# Patient Record
Sex: FEMALE | Race: WHITE | Employment: FULL TIME | URBAN - METROPOLITAN AREA
[De-identification: names, ages, dates, MRNs, and addresses within clinical notes are randomized per-mention and may not be internally consistent; named-entity substitution may affect disease eponyms.]

---

## 2021-12-13 ENCOUNTER — OFFICE VISIT (OUTPATIENT)
Dept: ORTHOPEDIC SURGERY | Age: 41
End: 2021-12-13
Payer: OTHER GOVERNMENT

## 2021-12-13 VITALS
BODY MASS INDEX: 32.14 KG/M2 | HEART RATE: 74 BPM | HEIGHT: 66 IN | OXYGEN SATURATION: 96 % | WEIGHT: 200 LBS | TEMPERATURE: 97.2 F

## 2021-12-13 DIAGNOSIS — M51.36 DDD (DEGENERATIVE DISC DISEASE), LUMBAR: Primary | ICD-10-CM

## 2021-12-13 DIAGNOSIS — M54.16 LUMBAR RADICULOPATHY, CHRONIC: ICD-10-CM

## 2021-12-13 PROCEDURE — 99204 OFFICE O/P NEW MOD 45 MIN: CPT | Performed by: PHYSICAL MEDICINE & REHABILITATION

## 2021-12-13 RX ORDER — ATORVASTATIN CALCIUM 80 MG/1
80 TABLET, FILM COATED ORAL DAILY
COMMUNITY

## 2021-12-13 RX ORDER — MULTIVITAMIN
1 CAPSULE ORAL DAILY
COMMUNITY

## 2021-12-13 RX ORDER — FLUTICASONE PROPIONATE 50 MCG
2 SPRAY, SUSPENSION (ML) NASAL DAILY
COMMUNITY
End: 2022-08-25

## 2021-12-13 RX ORDER — CETIRIZINE HCL 10 MG
10 TABLET ORAL DAILY
COMMUNITY
End: 2022-08-25

## 2021-12-13 RX ORDER — EVOLOCUMAB 140 MG/ML
INJECTION, SOLUTION SUBCUTANEOUS
COMMUNITY
Start: 2021-11-18

## 2021-12-13 NOTE — PROGRESS NOTES
[address], [address]  [place], [address] [place] [address] Street  Phone: [phone]  Fax: [phone]      Patient: Bhavana Kilgore                                                                              MRN: [id number]        YOB: 1980          AGE: 35 y.o. PCP: Leyda, MD [place]  Date:  12/13/21    Reason for Consultation: Back Pain      HPI:  Bhavana Kilgore is a 35 y.o. female with relevant PMH of HLD who presents with low back pain which began over 10 years ago. In 2016 she was diagnosed with spinal stenosis and tried a course of PT. Over the years she has remained active exercising regularly. Over the past 6 months to 1 year her pain has worsened. She now has sharp low back pain with intermittent radiation down either the left or the right leg. Pain is worse with sneezing. She saw her PCP who got an MRI which demonstrated L5/S1 severe DDD with b/l foraminal narrowing L>R. Denies any precipitating incident or trauma. Neurologic symptoms: No numbness, tingling, weakness, bowel or bladder changes. No recent falls      Location: The pain is located in the low back  Radiation: The pain does radiate bilateral L>R    Pain Score: Currently: 9/10    Quality: Pain is of a Stabbing, Stiff, Tight and Pulling quality. Aggravating: Pain is exacerbated by walking, standing, exercise and sneezing  Alleviating: The pain is alleviated by lying down with knees elevated    Prior Treatments:   TENs  Physical therapy- 2018  Stretching  Massage  Previous Medications:   Current Medications: ibuprofen   Previous work-up has included:   MRI lumbar spine 2021  T12-L1: Stable level. Minimal left eccentric disc bulge. Mild right facet arthropathy. No significant central or foraminal stenosis. L1-L2: Stable level. No significant central, foraminal or subarticular recess stenosis. L2-L3: Slight progression of minimal right eccentric disc bulge. No significant central, foraminal or subarticular recess stenosis. L3-L4: Slight progression of mild, broad-based, left eccentric disc bulge. Mild facet arthropathy. No significant central or subarticular recess stenosis. Mild bilateral foraminal stenosis. L4-L5: Slight progression of minimal diffuse disc bulge. Mild facet arthropathy. No significant central or subarticular recess stenosis. Mild bilateral foraminal stenosis. L5-S1: Stable level. Small/moderate, broad-based, left eccentric disc extrusion with T2 high intensity zone dissects caudally in the left paracentral station and extends into the left greater than right foramen. Mild endplate ridging. Mild facet arthropathy. Mild left subarticular recess stenosis. Otherwise patent canal. Mild/moderate left and mild right foraminal stenosis. Mass effect on the exiting left greater than right L5 nerve roots. Past Medical History:   Past Medical History:   Diagnosis Date    Anxiety     High cholesterol     Spinal stenosis of lumbar region       Past Surgical History: History reviewed. No pertinent surgical history. SocHx:   Social History     Tobacco Use    Smoking status: Never Smoker    Smokeless tobacco: Never Used   Substance Use Topics    Alcohol use: Not on file      FamHx:? No family history on file. Current Medications:    Current Outpatient Medications   Medication Sig Dispense Refill    atorvastatin (LIPITOR) 80 mg tablet Take 80 mg by mouth daily.  Repatha Syringe syringe by SubCUTAneous route every fourteen (14) days.  multivitamin capsule Take 1 Capsule by mouth daily.  cetirizine (ZyrTEC) 10 mg tablet Take 10 mg by mouth daily.  fluticasone propionate (Flonase Allergy Relief) 50 mcg/actuation nasal spray 2 Sprays by Both Nostrils route daily.         Allergies:  No Known Allergies     Review of Systems:   Gen:    Denied fevers, chills, malaise, fatigue, weight changes   Resp: Denied shortness of breath, cough, wheezing   CVS: Denied chest pain, palpitations   : Denied urinary urgency, frequency, incontinence   GI: Denied nausea, vomiting, constipation, diarrhea   Skin: Denied rashes, wounds   Psych: Denied anxiety, depression   Vasc: Denied claudication, ulcers   Hem: Denied easy bruising/bleeding   MSK: See HPI   Neuro: See HPI         Physical Exam     Vital Signs:   Visit Vitals  Pulse 74   Temp 97.2 °F (36.2 °C) (Skin)   Ht 5' 6\" (1.676 m)   Wt 200 lb (90.7 kg)   LMP 12/03/2021 (LMP Unknown)   SpO2 96% Comment: RA   BMI 32.28 kg/m²      General: ??????? Well nourished and well developed female without any acute distress   Psychiatric: ?  Alert and oriented x 3 with normal mood    HEENT: ???????? Atraumatic   Respiratory:   Breathing non-labored and non dyspneic   CV: ???????????????? Peripheral pulses intact, no peripheral edema   Skin: ????????????? No rashes       Neurologic: ?? Sensation: normal and grossly intact thebilateral, lower extremity(s)   Strength: 5/5 in the bilateral, lower extremity(s)  Reflexes: reveals 2+ symmetric DTRs throughout LE  Gait: normal     Musculoskeletal: Lumbar Exam     Inspection:   Alignment: Normal  Atrophy: None     Tenderness to Palpation:   Lumbar paraspinals Positive  Lumbar spinous processes Negative  SI Joint:  Negative  Gluteal:Negative   Greater trochanter: Negative      ROM:   Lumbar ROM: Abnormal pain with flexion and extension worse with flexion  Lumbar facet loading: Negative  Hip ROM: No reproduction of pain with movement     Special Tests      Slump test: Negative  SLR: Negative  Tight hamstrings  Tight hip flexors  JED: Positive low back pain  FADIR: Negative  Stinchfield: Negative    Medical Decision Making:    Images: The imaging results as well as the actual images of the studies below were reviewed and visualized. Labs: The results below were reviewed.    MRI lumbar spine images reviewed from [facility]- with L5/S1 DDD, left disc bulge with L>R foraminal narrowing, facet arthritis      Assessment:   - lumbar spondylosis l5-S1 with b/l foraminal narrowing    Plan:      -Physical therapy - referral to PT LE flexibility, core strengthening    -Medications - ibuprofen . Counseled regarding side effects and appropriate administration of medications.    -Diagnostics/Imaging - Reviewed MRI lumbar spine  -Injections -Referral to try L5/S IL SHANNON for b/l lumbar radiculopathy   -Lifestyle - Discussed exercises to avoid and those to continue   -Education - The patient's diagnosis, prognosis and treatment options were discussed today. All questions were answered.    F/U - after [address] [facility]

## 2021-12-13 NOTE — H&P (VIEW-ONLY)
[address]  [address], [address]  [place], [address] [place] [address] Street  Phone: [phone]  Fax: [phone]      Patient: Satnam Chavez                                                                              MRN: [id number]        YOB: 1980          AGE: 35 y.o. PCP: [place] Crabtree MD  Date:  12/13/21    Reason for Consultation: Back Pain      HPI:  Satnam Chavez is a 35 y.o. female with relevant PMH of HLD who presents with low back pain which began over 10 years ago. In 2016 she was diagnosed with spinal stenosis and tried a course of PT. Over the years she has remained active exercising regularly. Over the past 6 months to 1 year her pain has worsened. She now has sharp low back pain with intermittent radiation down either the left or the right leg. Pain is worse with sneezing. She saw her PCP who got an MRI which demonstrated L5/S1 severe DDD with b/l foraminal narrowing L>R. Denies any precipitating incident or trauma. Neurologic symptoms: No numbness, tingling, weakness, bowel or bladder changes. No recent falls      Location: The pain is located in the low back  Radiation: The pain does radiate bilateral L>R    Pain Score: Currently: 9/10    Quality: Pain is of a Stabbing, Stiff, Tight and Pulling quality. Aggravating: Pain is exacerbated by walking, standing, exercise and sneezing  Alleviating: The pain is alleviated by lying down with knees elevated    Prior Treatments:   TENs  Physical therapy- 2018  Stretching  Massage  Previous Medications:   Current Medications: ibuprofen   Previous work-up has included:   MRI lumbar spine 2021  T12-L1: Stable level. Minimal left eccentric disc bulge. Mild right facet arthropathy. No significant central or foraminal stenosis. L1-L2: Stable level. No significant central, foraminal or subarticular recess stenosis. L2-L3: Slight progression of minimal right eccentric disc bulge. No significant central, foraminal or subarticular recess stenosis. L3-L4: Slight progression of mild, broad-based, left eccentric disc bulge. Mild facet arthropathy. No significant central or subarticular recess stenosis. Mild bilateral foraminal stenosis. L4-L5: Slight progression of minimal diffuse disc bulge. Mild facet arthropathy. No significant central or subarticular recess stenosis. Mild bilateral foraminal stenosis. L5-S1: Stable level. Small/moderate, broad-based, left eccentric disc extrusion with T2 high intensity zone dissects caudally in the left paracentral station and extends into the left greater than right foramen. Mild endplate ridging. Mild facet arthropathy. Mild left subarticular recess stenosis. Otherwise patent canal. Mild/moderate left and mild right foraminal stenosis. Mass effect on the exiting left greater than right L5 nerve roots. Past Medical History:   Past Medical History:   Diagnosis Date    Anxiety     High cholesterol     Spinal stenosis of lumbar region       Past Surgical History: History reviewed. No pertinent surgical history. SocHx:   Social History     Tobacco Use    Smoking status: Never Smoker    Smokeless tobacco: Never Used   Substance Use Topics    Alcohol use: Not on file      FamHx:? No family history on file. Current Medications:    Current Outpatient Medications   Medication Sig Dispense Refill    atorvastatin (LIPITOR) 80 mg tablet Take 80 mg by mouth daily.  Repatha Syringe syringe by SubCUTAneous route every fourteen (14) days.  multivitamin capsule Take 1 Capsule by mouth daily.  cetirizine (ZyrTEC) 10 mg tablet Take 10 mg by mouth daily.  fluticasone propionate (Flonase Allergy Relief) 50 mcg/actuation nasal spray 2 Sprays by Both Nostrils route daily.         Allergies:  No Known Allergies     Review of Systems:   Gen:    Denied fevers, chills, malaise, fatigue, weight changes   Resp: Denied shortness of breath, cough, wheezing   CVS: Denied chest pain, palpitations   : Denied urinary urgency, frequency, incontinence   GI: Denied nausea, vomiting, constipation, diarrhea   Skin: Denied rashes, wounds   Psych: Denied anxiety, depression   Vasc: Denied claudication, ulcers   Hem: Denied easy bruising/bleeding   MSK: See HPI   Neuro: See HPI         Physical Exam     Vital Signs:   Visit Vitals  Pulse 74   Temp 97.2 °F (36.2 °C) (Skin)   Ht 5' 6\" (1.676 m)   Wt 200 lb (90.7 kg)   LMP 12/03/2021 (LMP Unknown)   SpO2 96% Comment: RA   BMI 32.28 kg/m²      General: ??????? Well nourished and well developed female without any acute distress   Psychiatric: ?  Alert and oriented x 3 with normal mood    HEENT: ???????? Atraumatic   Respiratory:   Breathing non-labored and non dyspneic   CV: ???????????????? Peripheral pulses intact, no peripheral edema   Skin: ????????????? No rashes       Neurologic: ?? Sensation: normal and grossly intact thebilateral, lower extremity(s)   Strength: 5/5 in the bilateral, lower extremity(s)  Reflexes: reveals 2+ symmetric DTRs throughout LE  Gait: normal     Musculoskeletal: Lumbar Exam     Inspection:   Alignment: Normal  Atrophy: None     Tenderness to Palpation:   Lumbar paraspinals Positive  Lumbar spinous processes Negative  SI Joint:  Negative  Gluteal:Negative   Greater trochanter: Negative      ROM:   Lumbar ROM: Abnormal pain with flexion and extension worse with flexion  Lumbar facet loading: Negative  Hip ROM: No reproduction of pain with movement     Special Tests      Slump test: Negative  SLR: Negative  Tight hamstrings  Tight hip flexors  JED: Positive low back pain  FADIR: Negative  Stinchfield: Negative    Medical Decision Making:    Images: The imaging results as well as the actual images of the studies below were reviewed and visualized. Labs: The results below were reviewed.    MRI lumbar spine images reviewed from [facility]- with L5/S1 DDD, left disc bulge with L>R foraminal narrowing, facet arthritis      Assessment:   - lumbar spondylosis l5-S1 with b/l foraminal narrowing    Plan:      -Physical therapy - referral to PT LE flexibility, core strengthening    -Medications - ibuprofen . Counseled regarding side effects and appropriate administration of medications.    -Diagnostics/Imaging - Reviewed MRI lumbar spine  -Injections -Referral to try L5/S IL SHANNON for b/l lumbar radiculopathy   -Lifestyle - Discussed exercises to avoid and those to continue   -Education - The patient's diagnosis, prognosis and treatment options were discussed today. All questions were answered.    F/U - after [address] [facility]

## 2021-12-13 NOTE — PROGRESS NOTES
Fortunato Bach presents today for   Chief Complaint   Patient presents with    Back Pain       Is someone accompanying this pt? no    Is the patient using any DME equipment during OV? no      Coordination of Care:  1. Have you been to the ER, urgent care clinic since your last visit? no  Hospitalized since your last visit? no    2. Have you seen or consulted any other health care providers outside of the [address] [place] [address] since your last visit? no Include any pap smears or colon screening.  no

## 2022-01-04 ENCOUNTER — APPOINTMENT (OUTPATIENT)
Dept: GENERAL RADIOLOGY | Age: 42
End: 2022-01-04
Attending: PHYSICAL MEDICINE & REHABILITATION
Payer: OTHER GOVERNMENT

## 2022-01-04 ENCOUNTER — HOSPITAL ENCOUNTER (OUTPATIENT)
Age: 42
Setting detail: OUTPATIENT SURGERY
Discharge: HOME | End: 2022-01-04
Attending: PHYSICAL MEDICINE & REHABILITATION | Admitting: PHYSICAL MEDICINE & REHABILITATION
Payer: OTHER GOVERNMENT

## 2022-01-04 VITALS
DIASTOLIC BLOOD PRESSURE: 82 MMHG | OXYGEN SATURATION: 96 % | SYSTOLIC BLOOD PRESSURE: 113 MMHG | TEMPERATURE: 99 F | RESPIRATION RATE: 16 BRPM | HEART RATE: 93 BPM

## 2022-01-04 LAB — HCG UR QL: NEGATIVE

## 2022-01-04 PROCEDURE — 77030014124 HC TY EPDRL BBMI -A: Performed by: PHYSICAL MEDICINE & REHABILITATION

## 2022-01-04 PROCEDURE — 62323 NJX INTERLAMINAR LMBR/SAC: CPT | Performed by: PHYSICAL MEDICINE & REHABILITATION

## 2022-01-04 PROCEDURE — 74011000636 HC RX REV CODE- 636: Performed by: PHYSICAL MEDICINE & REHABILITATION

## 2022-01-04 PROCEDURE — 76010000009 HC PAIN MGT 0 TO 30 MIN PROC: Performed by: PHYSICAL MEDICINE & REHABILITATION

## 2022-01-04 PROCEDURE — 74011000250 HC RX REV CODE- 250: Performed by: PHYSICAL MEDICINE & REHABILITATION

## 2022-01-04 PROCEDURE — 74011250637 HC RX REV CODE- 250/637: Performed by: PHYSICAL MEDICINE & REHABILITATION

## 2022-01-04 PROCEDURE — 81025 URINE PREGNANCY TEST: CPT

## 2022-01-04 PROCEDURE — 2709999900 HC NON-CHARGEABLE SUPPLY: Performed by: PHYSICAL MEDICINE & REHABILITATION

## 2022-01-04 PROCEDURE — 74011250636 HC RX REV CODE- 250/636: Performed by: PHYSICAL MEDICINE & REHABILITATION

## 2022-01-04 RX ORDER — DEXAMETHASONE SODIUM PHOSPHATE 100 MG/10ML
INJECTION INTRAMUSCULAR; INTRAVENOUS AS NEEDED
Status: DISCONTINUED | OUTPATIENT
Start: 2022-01-04 | End: 2022-01-04 | Stop reason: HOSPADM

## 2022-01-04 RX ORDER — LIDOCAINE HYDROCHLORIDE 10 MG/ML
INJECTION, SOLUTION EPIDURAL; INFILTRATION; INTRACAUDAL; PERINEURAL AS NEEDED
Status: DISCONTINUED | OUTPATIENT
Start: 2022-01-04 | End: 2022-01-04 | Stop reason: HOSPADM

## 2022-01-04 RX ORDER — DIAZEPAM 5 MG/1
5-20 TABLET ORAL ONCE
Status: COMPLETED | OUTPATIENT
Start: 2022-01-04 | End: 2022-01-04

## 2022-01-04 RX ADMIN — DIAZEPAM 10 MG: 5 TABLET ORAL at 11:27

## 2022-01-04 NOTE — DISCHARGE INSTRUCTIONS
[facility]   ([place])  Dr. Marques Patel, Dr. Viky Proctor, Dr. Judi Tyson Spinal Procedure (Block) Instructions    * Do not drive a car, operate heavy machinery or dangerous equipment, or make important decisions for 12-24 hours. * Light activity as tolerated; may rest for the remainder of the day. * Resume pre-block medications including those from your other doctors. * Do not drink alcoholic beverages for 24 hours. Alcohol and the medications you have received may interact and cause an adverse reaction. * You may feel better this evening and worse tomorrow, as the numbing medications wears off and the steroid has yet to begin to work. After 48-72 hrs the steroid should begin to release bringing you relief. If you had a medial branch block, no steroids were used. The medial branch block is a test to see if you are a candidate for radiofrequency ablation (RFA). The anesthetic (numbing medicine)  will wear off by the next day. * You may shower this evening and remove any bandages. * Avoid hot tubs/pools/tub soaks and heating pads for 24 hours. You may use cold packs on the procedure site as tolerated for the first 24 hours. * If a headache develops, drink plenty of fluids and rest.  Take over the counter medications for headache if needed. If the headache continues longer than 24 hours, call MD at the [id number] [place] [address] Avenue. [phone]    * Continue taking pain medications as needed. * You may resume your regular diet if tolerated. Otherwise, start with sips of water and advance slowly. * If Diabetic: check your blood sugar three times a day for the next 3 days. If your sugar is greater than 300 call your family doctor. If your sugar is greater than 400, have someone transport you to the nearest Emergency Room. * If you experience any of the following problems, Please Call the [address] [place] [address] Avenue at [phone].         * Excessive pain, swelling, redness or odor at or around the surgical area    * Fever of 101 or higher    * Nausea / Vomiting lasting longer than 4 hours or if unable to take medications. * Severe Headache    * Weakness or numbness in arms or legs that is not      resolving   * Any NEW signs of decreased circulation or nerve impairment in leg: change in color, swelling, persistent numbness, tingling                    * Prolonged increase in pain greater than 4 days      PATIENT INSTRUCTIONS:    After oral sedation, for 12-24 hours or while taking prescription Narcotics:  · Limit your activities  · Do not drive and operate hazardous machinery  · Do not make important personal or business decisions  · Do  not drink alcoholic beverages  · If you have not urinated within 8 hours after discharge, please contact your surgeon on call. *  Please give a list of your current medications to your Primary Care Provider. *  Please update this list whenever your medications are discontinued, doses are      changed, or new medications (including over-the-counter products) are added. *  Please carry medication information at all times in case of emergency situations. These are general instructions for a healthy lifestyle:    No smoking/ No tobacco products/ Avoid exposure to second hand smoke    Surgeon General's Warning:  Quitting smoking now greatly reduces serious risk to your health. Obesity, smoking, and sedentary lifestyle greatly increases your risk for illness    A healthy diet, regular physical exercise & weight monitoring are important for maintaining a healthy lifestyle    You may be retaining fluid if you have a history of heart failure or if you experience any of the following symptoms:  Weight gain of 3 pounds or more overnight or 5 pounds in a week, increased swelling in our hands or feet or shortness of breath while lying flat in bed.   Please call your doctor as soon as you notice any of these symptoms; do not wait until your next office visit. Recognize signs and symptoms of STROKE:    F-face looks uneven    A-arms unable to move or move unevenly    S-speech slurred or non-existent    T-time-call 911 as soon as signs and symptoms begin-DO NOT go       Back to bed or wait to see if you get better-TIME IS BRAIN.

## 2022-01-04 NOTE — INTERVAL H&P NOTE
Update History & Physical    The Patient's History and Physical of December 13, 2021 was reviewed. There was no change. The surgical site was confirmed by the patient and me. Plan:  The risk, benefits, expected outcome, and alternative to the recommended procedure have been discussed with the patient. Patient understands and wants to proceed with the procedure.     Electronically signed by Sanam Kinsey MD on 1/4/2022 at 11:54 AM

## 2022-01-04 NOTE — PROCEDURES
Intralaminar Epidural Steroid Procedure Note        Patient Name   Bassem Sandoval  Date of Procedure: January 4, 2022  Preoperative Diagnosis: Lumbar spinal stenosis  Postoperative Diagnosis: Same  Location [facility], [address]      Procedure:  Epidural Steroid Injection    Consent:  Informed consent was obtained prior to the procedure. In addition to the potential risks associated with the procedure itself, the patient was informed both verbally and in writing of the potential side effects of the use of glucocorticoid. The patient appeared to comprehend the informed consent and desired to have the procedure performed. Procedure in Detail:  The patient was taken to the procedure suite and placed in the prone position on the operating table on appropriate padding. The posterior lumbar region was prepped and draped in the usual sterile fashion. Intraoperative fluoroscopy was used to localize the L5-S1 interspace. The skin was infiltrated with 1% lidocaine. An 18-gauge standard #6 spinal Tuohy needle was advanced into the epidural space at L5-S1 under fluoroscopic guidance using the loss of resistance technique. No cerebrospinal fluid was seen throughout the procedure. Yes  A small amount of Isovue was injected into the epidural space, confirming appropriated needle placement on fluoroscopy. No vascular uptake was identified. Next, 2ml of 1% Lidocaine and 10mg of preservative free Dexamethasone were injected via the Tuohy needle. The needle was removed from the patient. The patient tolerated the procedure well and was discharged home with designated  and care instructions. Patient reported clint-procedural pain on Visual Analog Scale:  pre-6; post-3.       Signed By: Reese Lu MD                      January 4, 2022

## 2022-02-01 ENCOUNTER — VIRTUAL VISIT (OUTPATIENT)
Dept: ORTHOPEDIC SURGERY | Age: 42
End: 2022-02-01
Payer: OTHER GOVERNMENT

## 2022-02-01 DIAGNOSIS — M54.16 LUMBAR RADICULOPATHY, CHRONIC: Primary | ICD-10-CM

## 2022-02-01 DIAGNOSIS — M51.36 DDD (DEGENERATIVE DISC DISEASE), LUMBAR: ICD-10-CM

## 2022-02-01 PROCEDURE — 99214 OFFICE O/P EST MOD 30 MIN: CPT | Performed by: PHYSICAL MEDICINE & REHABILITATION

## 2022-02-01 RX ORDER — CELECOXIB 100 MG/1
100 CAPSULE ORAL 2 TIMES DAILY
Qty: 60 CAPSULE | Refills: 0 | Status: SHIPPED | OUTPATIENT
Start: 2022-02-01 | End: 2022-03-03

## 2022-02-01 NOTE — PROGRESS NOTES
[address]  [address], [address]  [facility] [address]  Phone: [phone]  Fax: [phone]      Patient: Nina Beltre                                                                              MRN: [id number]        YOB: 1980          AGE: 35 y.o. PCP: Other, MD [place]  Date:  02/01/22    Reason for Consultation: No chief complaint on file. Video Visit    HPI:  Nina Beltre is a 35 y.o. female with relevant PMH of HLD who presents with low back pain which began over 10 years ago. In 2016 she was diagnosed with spinal stenosis and tried a course of PT. Over the years she has remained active exercising regularly. Over the past 6 months to 1 year her pain has worsened. She now has sharp low back pain with intermittent radiation down either the left or the right leg. Pain is worse with sneezing. She saw her PCP who got an MRI which demonstrated L5/S1 severe DDD with b/l foraminal narrowing L>R. She had an L5-S1 IL SHANNON 1/4/2022 which helped a little about 10%. Had PT assessment 2 weeks ago first appointment next week      Denies any precipitating incident or trauma. Neurologic symptoms: No numbness, tingling, weakness, bowel or bladder changes. No recent falls      Location: The pain is located in the low back  Radiation: The pain does radiate bilateral L>R    Pain Score: Currently: 2/10  Nut at times gets sharp 9/10 shooting pain into b/l hips   Quality: Pain is of a Stabbing, Stiff, Tight and Pulling quality. Aggravating: Pain is exacerbated by walking, standing, exercise and sneezing  Alleviating: The pain is alleviated by lying down with knees elevated    Prior Treatments:   TENs  Physical therapy- 2018  Stretching  Massage  Injections: L5-S1 IL SHANNON 10% relief  Previous Medications:   Current Medications: ibuprofen   Previous work-up has included:   MRI lumbar spine 2021  T12-L1: Stable level.  Minimal left eccentric disc bulge. Mild right facet arthropathy. No significant central or foraminal stenosis. L1-L2: Stable level. No significant central, foraminal or subarticular recess stenosis. L2-L3: Slight progression of minimal right eccentric disc bulge. No significant central, foraminal or subarticular recess stenosis. L3-L4: Slight progression of mild, broad-based, left eccentric disc bulge. Mild facet arthropathy. No significant central or subarticular recess stenosis. Mild bilateral foraminal stenosis. L4-L5: Slight progression of minimal diffuse disc bulge. Mild facet arthropathy. No significant central or subarticular recess stenosis. Mild bilateral foraminal stenosis. L5-S1: Stable level. Small/moderate, broad-based, left eccentric disc extrusion with T2 high intensity zone dissects caudally in the left paracentral station and extends into the left greater than right foramen. Mild endplate ridging. Mild facet arthropathy. Mild left subarticular recess stenosis. Otherwise patent canal. Mild/moderate left and mild right foraminal stenosis. Mass effect on the exiting left greater than right L5 nerve roots. Past Medical History:   Past Medical History:   Diagnosis Date    Anxiety     High cholesterol     Spinal stenosis of lumbar region       Past Surgical History: No past surgical history on file. SocHx:   Social History     Tobacco Use    Smoking status: Never Smoker    Smokeless tobacco: Never Used   Substance Use Topics    Alcohol use: Not on file      FamHx:? No family history on file. Current Medications:    Current Outpatient Medications   Medication Sig Dispense Refill    atorvastatin (LIPITOR) 80 mg tablet Take 80 mg by mouth daily.  Repatha Syringe syringe by SubCUTAneous route every fourteen (14) days.  multivitamin capsule Take 1 Capsule by mouth daily.  cetirizine (ZyrTEC) 10 mg tablet Take 10 mg by mouth daily.       fluticasone propionate (Flonase Allergy Relief) 50 mcg/actuation nasal spray 2 Sprays by Both Nostrils route daily. Allergies:  No Known Allergies       Medical Decision Making:    Images: The imaging results as well as the actual images of the studies below were reviewed and visualized. Assessment:   - lumbar spondylosis l5-S1 with b/l foraminal narrowing    Plan:      -Physical therapy - Continue PT LE flexibility, core strengthening    -Medications -will try celebrex 100mg bid prn in place of ibuprofen . Counseled regarding side effects and appropriate administration of medications.    -Diagnostics/Imaging - Reviewed MRI lumbar spine  -Injections -consider b/l L5/S1 TF SHANNON   -Lifestyle - Discussed exercises to avoid and those to continue   -Education - The patient's diagnosis, prognosis and treatment options were discussed today. All questions were answered. F/U - after completing PT        [address] [facility]      I was in the office while conducting this encounter. Patient not in the office    Consent:  She and/or her healthcare decision maker is aware that this patient-initiated Telehealth encounter is a billable service, with coverage as determined by her insurance carrier. She is aware that she may receive a bill and has provided verbal consent to proceed: Yes    This virtual visit was conducted via [employer]. Pursuant to the emergency declaration under the [facility] waiver authority and the [employer] and [employer] Act, this Virtual  Visit was conducted to reduce the patient's risk of exposure to COVID-19 and provide continuity of care for an established patient. Services were provided through a video synchronous discussion virtually to substitute for in-person clinic visit. Due to this being a TeleHealth evaluation, many elements of the physical examination are unable to be assessed.      Total Time: minutes: 11-20 minutes.

## 2022-04-06 ENCOUNTER — VIRTUAL VISIT (OUTPATIENT)
Dept: ORTHOPEDIC SURGERY | Age: 42
End: 2022-04-06
Payer: OTHER GOVERNMENT

## 2022-04-06 DIAGNOSIS — M54.16 LUMBAR RADICULOPATHY, CHRONIC: Primary | ICD-10-CM

## 2022-04-06 DIAGNOSIS — M51.36 DDD (DEGENERATIVE DISC DISEASE), LUMBAR: ICD-10-CM

## 2022-04-06 PROCEDURE — 99214 OFFICE O/P EST MOD 30 MIN: CPT | Performed by: PHYSICAL MEDICINE & REHABILITATION

## 2022-04-06 RX ORDER — CELECOXIB 100 MG/1
100 CAPSULE ORAL 2 TIMES DAILY
Qty: 60 CAPSULE | Refills: 2 | Status: SHIPPED | OUTPATIENT
Start: 2022-04-06 | End: 2022-06-29

## 2022-04-06 RX ORDER — METAXALONE 800 MG/1
800 TABLET ORAL
Qty: 30 TABLET | Refills: 0 | Status: SHIPPED | OUTPATIENT
Start: 2022-04-06 | End: 2022-05-06

## 2022-04-06 NOTE — PROGRESS NOTES
[address]  [address], [address]  [address]  Phone: [phone]  Fax: [phone]      Patient: Nomi Javier                                                                              MRN: [id number]        YOB: 1980          AGE: 35 y.o. PCP: [place] Crabtree MD  Date:  04/06/22    Reason for Consultation: Back Pain    Video Visit    HPI:  Nomi Javier is a 35 y.o. female with relevant PMH of HLD who presented with low back pain which began over 10 years ago. In 2016 she was diagnosed with spinal stenosis and tried a course of PT. Over the years she has remained active exercising regularly. Over the past 6 months to 1 year her pain has worsened. She now has sharp low back pain with intermittent radiation down either the left or the right leg. Pain is worse with sneezing. She saw her PCP who got an MRI which demonstrated L5/S1 severe DDD with b/l foraminal narrowing L>R. She had an L5-S1 IL SHANNON 1/4/2022 which helped a little about 10%. She completed a course of PT and found traction helpful. She tried dry needling which helped a bit. She continues to have sharp spasm in her low back        Denies any precipitating incident or trauma. Neurologic symptoms: No numbness, tingling, weakness, bowel or bladder changes. No recent falls      Location: The pain is located in the low back  Radiation: The pain does radiate bilateral L>R    Pain Score: Currently: 4/10  But at times gets sharp 9/10 shooting pain into b/l hips   Quality: Pain is of a Stabbing, Stiff, Tight and Pulling quality. Aggravating: Pain is exacerbated by walking, standing, exercise and sneezing  Alleviating:  The pain is alleviated by lying down with knees elevated    Prior Treatments:   TENs  Physical therapy- 2018  Stretching  Massage  Injections: L5-S1 IL SHANNON 10% relief  Previous Medications:   Current Medications: celebrex 100mg helpes Previous work-up has included:   MRI lumbar spine 2021  T12-L1: Stable level. Minimal left eccentric disc bulge. Mild right facet arthropathy. No significant central or foraminal stenosis. L1-L2: Stable level. No significant central, foraminal or subarticular recess stenosis. L2-L3: Slight progression of minimal right eccentric disc bulge. No significant central, foraminal or subarticular recess stenosis. L3-L4: Slight progression of mild, broad-based, left eccentric disc bulge. Mild facet arthropathy. No significant central or subarticular recess stenosis. Mild bilateral foraminal stenosis. L4-L5: Slight progression of minimal diffuse disc bulge. Mild facet arthropathy. No significant central or subarticular recess stenosis. Mild bilateral foraminal stenosis. L5-S1: Stable level. Small/moderate, broad-based, left eccentric disc extrusion with T2 high intensity zone dissects caudally in the left paracentral station and extends into the left greater than right foramen. Mild endplate ridging. Mild facet arthropathy. Mild left subarticular recess stenosis. Otherwise patent canal. Mild/moderate left and mild right foraminal stenosis. Mass effect on the exiting left greater than right L5 nerve roots. Past Medical History:   Past Medical History:   Diagnosis Date    Anxiety     High cholesterol     Spinal stenosis of lumbar region       Past Surgical History: No past surgical history on file. SocHx:   Social History     Tobacco Use    Smoking status: Never Smoker    Smokeless tobacco: Never Used   Substance Use Topics    Alcohol use: Not on file      FamHx:? No family history on file. Current Medications:    Current Outpatient Medications   Medication Sig Dispense Refill    atorvastatin (LIPITOR) 80 mg tablet Take 80 mg by mouth daily.  Repatha Syringe syringe by SubCUTAneous route every fourteen (14) days.  multivitamin capsule Take 1 Capsule by mouth daily.       cetirizine (ZyrTEC) 10 mg tablet Take 10 mg by mouth daily.  fluticasone propionate (Flonase Allergy Relief) 50 mcg/actuation nasal spray 2 Sprays by Both Nostrils route daily. Allergies:  No Known Allergies       Medical Decision Making:    Images: The imaging results as well as the actual images of the studies below were reviewed and visualized. Assessment:   - lumbar spondylosis l5-S1 with b/l foraminal narrowing    Plan:      -Physical therapy - Continue PT HEP  -Medications -renew celebrex 100mg bid prn in place of ibuprofen . -rx for skelaxin for muscle spams prn Counseled regarding side effects and appropriate administration of medications.    -Diagnostics/Imaging - Reviewed MRI lumbar spine  -Injections Referral to try b/l L% TF SHANNON  -Lifestyle - Discussed exercises to avoid and those to continue   -Education - The patient's diagnosis, prognosis and treatment options were discussed today. All questions were answered. F/U - after SHANNON, consider referral to spine surgery        [address] [facility]      I was in the office while conducting this encounter. Patient not in the office    Consent:  She and/or her healthcare decision maker is aware that this patient-initiated Telehealth encounter is a billable service, with coverage as determined by her insurance carrier. She is aware that she may receive a bill and has provided verbal consent to proceed: Yes    This virtual visit was conducted via [employer]. me. Pursuant to the emergency declaration under the [facility] waiver authority and the [employer] and [employer] Act, this Virtual  Visit was conducted to reduce the patient's risk of exposure to COVID-19 and provide continuity of care for an established patient. Services were provided through a video synchronous discussion virtually to substitute for in-person clinic visit. Due to this being a TeleHealth evaluation, many elements of the physical examination are unable to be assessed. Total Time: minutes: 5-10 minutes.

## 2022-04-06 NOTE — H&P (VIEW-ONLY)
[address]  [address], [address]  [address]  Phone: [phone]  Fax: [phone]      Patient: Manda Birmingham                                                                              MRN: [id number]        YOB: 1980          AGE: 35 y.o. PCP: [place] Crabtree MD  Date:  04/06/22    Reason for Consultation: Back Pain    Video Visit    HPI:  Manda Birmingham is a 35 y.o. female with relevant PMH of HLD who presented with low back pain which began over 10 years ago. In 2016 she was diagnosed with spinal stenosis and tried a course of PT. Over the years she has remained active exercising regularly. Over the past 6 months to 1 year her pain has worsened. She now has sharp low back pain with intermittent radiation down either the left or the right leg. Pain is worse with sneezing. She saw her PCP who got an MRI which demonstrated L5/S1 severe DDD with b/l foraminal narrowing L>R. She had an L5-S1 IL SHANNON 1/4/2022 which helped a little about 10%. She completed a course of PT and found traction helpful. She tried dry needling which helped a bit. She continues to have sharp spasm in her low back        Denies any precipitating incident or trauma. Neurologic symptoms: No numbness, tingling, weakness, bowel or bladder changes. No recent falls      Location: The pain is located in the low back  Radiation: The pain does radiate bilateral L>R    Pain Score: Currently: 4/10  But at times gets sharp 9/10 shooting pain into b/l hips   Quality: Pain is of a Stabbing, Stiff, Tight and Pulling quality. Aggravating: Pain is exacerbated by walking, standing, exercise and sneezing  Alleviating:  The pain is alleviated by lying down with knees elevated    Prior Treatments:   TENs  Physical therapy- 2018  Stretching  Massage  Injections: L5-S1 IL SHANNON 10% relief  Previous Medications:   Current Medications: celebrex 100mg helpes Previous work-up has included:   MRI lumbar spine 2021  T12-L1: Stable level. Minimal left eccentric disc bulge. Mild right facet arthropathy. No significant central or foraminal stenosis. L1-L2: Stable level. No significant central, foraminal or subarticular recess stenosis. L2-L3: Slight progression of minimal right eccentric disc bulge. No significant central, foraminal or subarticular recess stenosis. L3-L4: Slight progression of mild, broad-based, left eccentric disc bulge. Mild facet arthropathy. No significant central or subarticular recess stenosis. Mild bilateral foraminal stenosis. L4-L5: Slight progression of minimal diffuse disc bulge. Mild facet arthropathy. No significant central or subarticular recess stenosis. Mild bilateral foraminal stenosis. L5-S1: Stable level. Small/moderate, broad-based, left eccentric disc extrusion with T2 high intensity zone dissects caudally in the left paracentral station and extends into the left greater than right foramen. Mild endplate ridging. Mild facet arthropathy. Mild left subarticular recess stenosis. Otherwise patent canal. Mild/moderate left and mild right foraminal stenosis. Mass effect on the exiting left greater than right L5 nerve roots. Past Medical History:   Past Medical History:   Diagnosis Date    Anxiety     High cholesterol     Spinal stenosis of lumbar region       Past Surgical History: No past surgical history on file. SocHx:   Social History     Tobacco Use    Smoking status: Never Smoker    Smokeless tobacco: Never Used   Substance Use Topics    Alcohol use: Not on file      FamHx:? No family history on file. Current Medications:    Current Outpatient Medications   Medication Sig Dispense Refill    atorvastatin (LIPITOR) 80 mg tablet Take 80 mg by mouth daily.  Repatha Syringe syringe by SubCUTAneous route every fourteen (14) days.  multivitamin capsule Take 1 Capsule by mouth daily.       cetirizine (ZyrTEC) 10 mg tablet Take 10 mg by mouth daily.  fluticasone propionate (Flonase Allergy Relief) 50 mcg/actuation nasal spray 2 Sprays by Both Nostrils route daily. Allergies:  No Known Allergies       Medical Decision Making:    Images: The imaging results as well as the actual images of the studies below were reviewed and visualized. Assessment:   - lumbar spondylosis l5-S1 with b/l foraminal narrowing    Plan:      -Physical therapy - Continue PT HEP  -Medications -renew celebrex 100mg bid prn in place of ibuprofen . -rx for skelaxin for muscle spams prn Counseled regarding side effects and appropriate administration of medications.    -Diagnostics/Imaging - Reviewed MRI lumbar spine  -Injections Referral to try b/l L% TF SHANNON  -Lifestyle - Discussed exercises to avoid and those to continue   -Education - The patient's diagnosis, prognosis and treatment options were discussed today. All questions were answered. F/U - after SHANNON, consider referral to spine surgery        [address] [facility]      I was in the office while conducting this encounter. Patient not in the office    Consent:  She and/or her healthcare decision maker is aware that this patient-initiated Telehealth encounter is a billable service, with coverage as determined by her insurance carrier. She is aware that she may receive a bill and has provided verbal consent to proceed: Yes    This virtual visit was conducted via [employer]. me. Pursuant to the emergency declaration under the [facility] waiver authority and the [employer] and [employer] Act, this Virtual  Visit was conducted to reduce the patient's risk of exposure to COVID-19 and provide continuity of care for an established patient. Services were provided through a video synchronous discussion virtually to substitute for in-person clinic visit. Due to this being a TeleHealth evaluation, many elements of the physical examination are unable to be assessed. Total Time: minutes: 5-10 minutes.

## 2022-04-19 ENCOUNTER — APPOINTMENT (OUTPATIENT)
Dept: GENERAL RADIOLOGY | Age: 42
End: 2022-04-19
Attending: PHYSICAL MEDICINE & REHABILITATION
Payer: OTHER GOVERNMENT

## 2022-04-19 ENCOUNTER — HOSPITAL ENCOUNTER (OUTPATIENT)
Age: 42
Setting detail: OUTPATIENT SURGERY
Discharge: HOME | End: 2022-04-19
Attending: PHYSICAL MEDICINE & REHABILITATION | Admitting: PHYSICAL MEDICINE & REHABILITATION
Payer: OTHER GOVERNMENT

## 2022-04-19 VITALS
SYSTOLIC BLOOD PRESSURE: 117 MMHG | RESPIRATION RATE: 16 BRPM | HEART RATE: 81 BPM | OXYGEN SATURATION: 97 % | DIASTOLIC BLOOD PRESSURE: 71 MMHG | TEMPERATURE: 98.5 F

## 2022-04-19 LAB — HCG UR QL: NEGATIVE

## 2022-04-19 PROCEDURE — 74011250636 HC RX REV CODE- 250/636: Performed by: PHYSICAL MEDICINE & REHABILITATION

## 2022-04-19 PROCEDURE — 81025 URINE PREGNANCY TEST: CPT

## 2022-04-19 PROCEDURE — 77030003672 HC NDL SPN HALY -A: Performed by: PHYSICAL MEDICINE & REHABILITATION

## 2022-04-19 PROCEDURE — 74011000636 HC RX REV CODE- 636: Performed by: PHYSICAL MEDICINE & REHABILITATION

## 2022-04-19 PROCEDURE — 76010000009 HC PAIN MGT 0 TO 30 MIN PROC: Performed by: PHYSICAL MEDICINE & REHABILITATION

## 2022-04-19 PROCEDURE — 77030039433 HC TY MYLEOGRAM BD -B: Performed by: PHYSICAL MEDICINE & REHABILITATION

## 2022-04-19 PROCEDURE — 74011250637 HC RX REV CODE- 250/637: Performed by: PHYSICAL MEDICINE & REHABILITATION

## 2022-04-19 PROCEDURE — 2709999900 HC NON-CHARGEABLE SUPPLY: Performed by: PHYSICAL MEDICINE & REHABILITATION

## 2022-04-19 PROCEDURE — 74011000250 HC RX REV CODE- 250: Performed by: PHYSICAL MEDICINE & REHABILITATION

## 2022-04-19 PROCEDURE — 64483 NJX AA&/STRD TFRM EPI L/S 1: CPT | Performed by: PHYSICAL MEDICINE & REHABILITATION

## 2022-04-19 PROCEDURE — 77030003669 HC NDL SPN COOK -B: Performed by: PHYSICAL MEDICINE & REHABILITATION

## 2022-04-19 RX ORDER — LIDOCAINE HYDROCHLORIDE 10 MG/ML
INJECTION, SOLUTION EPIDURAL; INFILTRATION; INTRACAUDAL; PERINEURAL AS NEEDED
Status: DISCONTINUED | OUTPATIENT
Start: 2022-04-19 | End: 2022-04-19 | Stop reason: HOSPADM

## 2022-04-19 RX ORDER — DIAZEPAM 5 MG/1
5-20 TABLET ORAL ONCE
Status: COMPLETED | OUTPATIENT
Start: 2022-04-19 | End: 2022-04-19

## 2022-04-19 RX ORDER — DEXAMETHASONE SODIUM PHOSPHATE 100 MG/10ML
INJECTION INTRAMUSCULAR; INTRAVENOUS AS NEEDED
Status: DISCONTINUED | OUTPATIENT
Start: 2022-04-19 | End: 2022-04-19 | Stop reason: HOSPADM

## 2022-04-19 RX ADMIN — DIAZEPAM 10 MG: 5 TABLET ORAL at 11:56

## 2022-04-19 NOTE — PERIOP NOTES
Patient verbally consented to HIPAA and verbalizes understanding of discharge instructions. Signature pad not working.

## 2022-04-19 NOTE — PROCEDURES
SELECTIVE NERVE ROOT BLOCK PROCEDURE NOTE      Patient Name: Arsenio Hawthorne  Date of Procedure: April 19, 2022  Preoperative Diagnosis:  Lumbar radiculopathy [M54.16]  Post Operative Diagnosis:  Lumbar radiculopathy [M54.16]  Location:  [place], [place]    Procedure :    bilateral  L5 Selective Nerve Root Block      Consent:  Informed consent was obtained prior to the procedure. The patient was given the opportunity to ask questions regarding the procedure and its associated risks. In addition to the potential risks associated with the procedure itself, the patient was informed both verbally and in writing of the potential side effects of the use of glucocorticoid. The patient appeared to comprehend the informed consent and desired to have the procedure performed. Procedure: The patient was placed in the prone position on the fluoroscopy table and the back was prepped and draped in the usual sterile manner. The exact spinal level was  identified using fluoroscopy, and Lidocaine 1 % was injected locally, a 22 gauge spinal needle was passed to the transverse process. The depth was noted and the needle redirected to pass inferior and approximately one cm anterior to the transverse process. YES  1 cc of Isovue M-200 was used to verify positioning in the epidural and paravertebral space and outlined the course of the spinal nerve into the epidural space. The same procedure was repeated at each spinal level indicated above. No vascular uptake was identified. A total of 10 mg of preservative free Dexamethasone and 1 cc of Lidocaine/site was slowly injected. The patient tolerated the procedure well. The injection area was cleaned and bandaids applied. Not excessive bleeding was noted. Patient dressed and discharged to home with instructions. Discussion: The patient tolerated the procedure well.  Patient reported clint-procedural pain on Visual Analog Scale: pre-4; post-2.                                               Humberto Treviño MD  April 19, 2022

## 2022-04-19 NOTE — INTERVAL H&P NOTE
Update History & Physical    The Patient's History and Physical of April 6, 2022 was reviewed. There was no change. The surgical site was confirmed by the patient and me. Plan:  The risk, benefits, expected outcome, and alternative to the recommended procedure have been discussed with the patient. Patient understands and wants to proceed with the procedure.     Electronically signed by Soto Conn MD on 4/19/2022 at 12:25 PM

## 2022-05-10 ENCOUNTER — VIRTUAL VISIT (OUTPATIENT)
Dept: ORTHOPEDIC SURGERY | Age: 42
End: 2022-05-10
Payer: OTHER GOVERNMENT

## 2022-05-10 DIAGNOSIS — M54.16 LUMBAR RADICULOPATHY, CHRONIC: Primary | ICD-10-CM

## 2022-05-10 DIAGNOSIS — M51.36 DDD (DEGENERATIVE DISC DISEASE), LUMBAR: ICD-10-CM

## 2022-05-10 PROCEDURE — 99212 OFFICE O/P EST SF 10 MIN: CPT | Performed by: PHYSICAL MEDICINE & REHABILITATION

## 2022-05-10 NOTE — PROGRESS NOTES
[address]  [address]  Phone: [phone]  Fax: [phone]      Patient: Regla Liao                                                                              MRN: [id number]        YOB: 1980          AGE: 35 y.o. PCP: Leyda, MD [place]  Date:  05/10/22    Reason for Consultation: Back Pain    Video Visit    HPI:  Regla Liao is a 35 y.o. female with relevant PMH of HLD who presented with low back pain which began over 10 years ago. In 2016 she was diagnosed with spinal stenosis and tried a course of PT. Over the years she has remained active exercising regularly. Over the past 6 months to 1 year her pain has worsened. She now has sharp low back pain with intermittent radiation down either the left or the right leg. Pain is worse with sneezing. She saw her PCP who got an MRI which demonstrated L5/S1 severe DDD with b/l foraminal narrowing L>R. She had an L5-S1 IL SHANNON 1/4/2022 which helped a little about 10%. She completed a course of PT and found traction helpful. She tried dry needling which helped a bit. She tried bilateral L5 SNRB 4/19/2022 which gave her about 70% relief. Denies any precipitating incident or trauma. Neurologic symptoms: No numbness, tingling, weakness, bowel or bladder changes. No recent falls      Location: The pain is located in the low back  Radiation: The pain does radiate bilateral L=R    Pain Score: Currently: 4/10  But at times gets sharp 8/10 shooting pain into b/l hips   Quality: Pain is of a Stabbing, Stiff, Tight and Pulling quality. Aggravating: Pain is exacerbated by walking, standing, exercise and sneezing  Alleviating:  The pain is alleviated by lying down with knees elevated    Prior Treatments:   TENs  Physical therapy- 2018  Stretching  Massage  Injections: L5-S1 IL SHANNON 10% relief  Bilateral 4/19/2022 L5 SNRB 70%    Previous Medications:   Current Medications: celebrex 100mg helps   Previous work-up has included:   MRI lumbar spine 2021  T12-L1: Stable level. Minimal left eccentric disc bulge. Mild right facet arthropathy. No significant central or foraminal stenosis. L1-L2: Stable level. No significant central, foraminal or subarticular recess stenosis. L2-L3: Slight progression of minimal right eccentric disc bulge. No significant central, foraminal or subarticular recess stenosis. L3-L4: Slight progression of mild, broad-based, left eccentric disc bulge. Mild facet arthropathy. No significant central or subarticular recess stenosis. Mild bilateral foraminal stenosis. L4-L5: Slight progression of minimal diffuse disc bulge. Mild facet arthropathy. No significant central or subarticular recess stenosis. Mild bilateral foraminal stenosis. L5-S1: Stable level. Small/moderate, broad-based, left eccentric disc extrusion with T2 high intensity zone dissects caudally in the left paracentral station and extends into the left greater than right foramen. Mild endplate ridging. Mild facet arthropathy. Mild left subarticular recess stenosis. Otherwise patent canal. Mild/moderate left and mild right foraminal stenosis. Mass effect on the exiting left greater than right L5 nerve roots. Past Medical History:   Past Medical History:   Diagnosis Date    Anxiety     High cholesterol     Spinal stenosis of lumbar region       Past Surgical History: No past surgical history on file. SocHx:   Social History     Tobacco Use    Smoking status: Never Smoker    Smokeless tobacco: Never Used   Substance Use Topics    Alcohol use: Not on file      FamHx:? No family history on file. Current Medications:    Current Outpatient Medications   Medication Sig Dispense Refill    celecoxib (CELEBREX) 100 mg capsule Take 1 Capsule by mouth two (2) times a day for 90 days.  60 Capsule 2    atorvastatin (LIPITOR) 80 mg tablet Take 80 mg by mouth daily.  Repatha Syringe syringe by SubCUTAneous route every fourteen (14) days.  multivitamin capsule Take 1 Capsule by mouth daily.  cetirizine (ZyrTEC) 10 mg tablet Take 10 mg by mouth daily.  fluticasone propionate (Flonase Allergy Relief) 50 mcg/actuation nasal spray 2 Sprays by Both Nostrils route daily. Allergies:  No Known Allergies       Medical Decision Making:    Images: The imaging results as well as the actual images of the studies below were reviewed and visualized. MRI lumbar spine 11/17/2021  Small/moderate, broad-based, left eccentric disc extrusion with T2 high intensity zone dissects caudally in the left paracentral station and extends into the left greater than right foramen. Mild endplate ridging. Mild facet arthropathy. Mild left subarticular recess stenosis. Otherwise patent canal. Mild/moderate left and mild right foraminal stenosis. Mass effect on the exiting left greater than right L5 nerve roots. Assessment:   - lumbar spondylosis l5-S1 with b/l foraminal narrowing    Plan:      -she would like to consider her surgical options, she has had over 10 years of pain and is hoping to find permanent solution- referral placed to discuss possibilities with Dr. Gonzales Self  -Physical therapy - Continue PT HEP  -Medications  celebrex 100mg bid prn in place of ibuprofen . -rx for skelaxin for muscle spams prn Counseled regarding side effects and appropriate administration of medications.    -Diagnostics/Imaging - Reviewed MRI lumbar spine  -Injectionss/p  b/l L5 TF SHANNON  -Lifestyle - Discussed exercises to avoid and those to continue   -Education - The patient's diagnosis, prognosis and treatment options were discussed today. All questions were answered. F/U - in2-3 months consider repeat b/l L5 TF SHANNON       [facility]      I was in the office while conducting this encounter.  Patient not in the office    Consent:  She and/or her healthcare decision maker is aware that this patient-initiated Telehealth encounter is a billable service, with coverage as determined by her insurance carrier. She is aware that she may receive a bill and has provided verbal consent to proceed: Yes    This virtual visit was conducted via [employer]. Pursuant to the emergency declaration under the [address] [place] [address] waiver authority and the [employer] Act, this Virtual  Visit was conducted to reduce the patient's risk of exposure to COVID-19 and provide continuity of care for an established patient. Services were provided through a video synchronous discussion virtually to substitute for in-person clinic visit. Due to this being a TeleHealth evaluation, many elements of the physical examination are unable to be assessed. Total Time: minutes: 11-20 minutes.

## 2022-06-29 RX ORDER — CELECOXIB 100 MG/1
100 CAPSULE ORAL 2 TIMES DAILY
Qty: 60 CAPSULE | Refills: 2 | Status: SHIPPED | OUTPATIENT
Start: 2022-06-29 | End: 2022-09-19

## 2022-08-25 ENCOUNTER — OFFICE VISIT (OUTPATIENT)
Dept: ORTHOPEDIC SURGERY | Age: 42
End: 2022-08-25
Payer: OTHER GOVERNMENT

## 2022-08-25 DIAGNOSIS — M51.36 DDD (DEGENERATIVE DISC DISEASE), LUMBAR: Primary | ICD-10-CM

## 2022-08-25 PROCEDURE — 99212 OFFICE O/P EST SF 10 MIN: CPT | Performed by: PHYSICAL MEDICINE & REHABILITATION

## 2022-08-25 NOTE — PROGRESS NOTES
[address]  [address]  [address]  Phone: [phone]  Fax: [phone]      Patient: Bhavana Kilgore                                                                              MRN: [id number]        YOB: 1980          AGE: 38 y. o. PCP: Leyda, MD [place]  Date:  08/25/22    Reason for Consultation: Back Pain (Lumbar)      HPI:  Bhavana Kilgore is a 38 y.o. female with relevant PMH of HLD who presented with low back pain which began over 10 years ago. In 2016 she was diagnosed with spinal stenosis and tried a course of PT. Over the years she has remained active exercising regularly. Over the past 6 months to 1 year her pain has worsened. She now has sharp low back pain with intermittent radiation down either the left or the right leg. Pain is worse with sneezing. She saw her PCP who got an MRI which demonstrated L5/S1 severe DDD with b/l foraminal narrowing L>R. She had an L5-S1 IL SHANNON 1/4/2022 which helped a little about 10%. She completed a course of PT and found traction helpful. She tried dry needling which helped a bit. She tried bilateral L5 SNRB 4/19/2022 which gave her about 70% relief. Since her last visit she has been doing very well. She exercises 3-5 days per week orange theory, stretch lab, etc.  She would like to try dry needling agian       Denies any precipitating incident or trauma. Neurologic symptoms: No numbness, tingling, weakness, bowel or bladder changes. No recent falls      Location: The pain is located in the low back  Radiation: The pain does radiate bilateral L=R    Pain Score: Currently: 3/10  Quality: Pain is of a Stabbing, Stiff, Tight and Pulling quality. Aggravating: Pain is exacerbated by walking, standing, exercise and sneezing  Alleviating:  The pain is alleviated by lying down with knees elevated    Prior Treatments:   TENs  Physical therapy- 2018  Stretching  Massage  Injections: L5-S1 IL SHANNON 10% relief  Bilateral 4/19/2022 L5 SNRB 70%    Previous Medications:   Current Medications: celebrex 100mg helps   Previous work-up has included:   MRI lumbar spine 2021  T12-L1: Stable level. Minimal left eccentric disc bulge. Mild right facet arthropathy. No significant central or foraminal stenosis. L1-L2: Stable level. No significant central, foraminal or subarticular recess stenosis. L2-L3: Slight progression of minimal right eccentric disc bulge. No significant central, foraminal or subarticular recess stenosis. L3-L4: Slight progression of mild, broad-based, left eccentric disc bulge. Mild facet arthropathy. No significant central or subarticular recess stenosis. Mild bilateral foraminal stenosis. L4-L5: Slight progression of minimal diffuse disc bulge. Mild facet arthropathy. No significant central or subarticular recess stenosis. Mild bilateral foraminal stenosis. L5-S1: Stable level. Small/moderate, broad-based, left eccentric disc extrusion with T2 high intensity zone dissects caudally in the left paracentral station and extends into the left greater than right foramen. Mild endplate ridging. Mild facet arthropathy. Mild left subarticular recess stenosis. Otherwise patent canal. Mild/moderate left and mild right foraminal stenosis. Mass effect on the exiting left greater than right L5 nerve roots. Past Medical History:   Past Medical History:   Diagnosis Date    Anxiety     High cholesterol     Spinal stenosis of lumbar region       Past Surgical History: No past surgical history on file. SocHx:   Social History     Tobacco Use    Smoking status: Never    Smokeless tobacco: Never   Substance Use Topics    Alcohol use: Not on file      FamHx:? No family history on file.     Current Medications:    Current Outpatient Medications   Medication Sig Dispense Refill    celecoxib (CELEBREX) 100 mg capsule TAKE 1 CAPSULE BY MOUTH TWO (2) TIMES A DAY FOR 90 DAYS. 60 Capsule 2    atorvastatin (LIPITOR) 80 mg tablet Take 80 mg by mouth daily. Repatha Syringe syringe by SubCUTAneous route every fourteen (14) days. multivitamin capsule Take 1 Capsule by mouth daily. cetirizine (ZyrTEC) 10 mg tablet Take 10 mg by mouth daily. fluticasone propionate (Flonase Allergy Relief) 50 mcg/actuation nasal spray 2 Sprays by Both Nostrils route daily. Allergies:  No Known Allergies     General: ??????? Well nourished and well developed female without any acute distress   Psychiatric: ?  Alert and oriented x 3 with normal mood    HEENT: ???????? Atraumatic   Respiratory:   Breathing non-labored and non dyspneic   CV: ???????????????? Peripheral pulses intact, no peripheral edema   Skin: ????????????? No rashes         Neurologic: ?? Sensation: normal and grossly intact thebilateral, lower extremity(s)   Strength: 5/5 in the bilateral, lower extremity(s)  Reflexes: reveals 2+ symmetric DTRs throughout LE  Gait: normal      Musculoskeletal: Lumbar Exam      Inspection:   Alignment: Normal  Atrophy: None      Tenderness to Palpation:   Lumbar paraspinals Positive  Lumbar spinous processes Negative  SI Joint:  Negative  Gluteal:Negative   Greater trochanter: Negative        ROM:   Lumbar ROM: NA  Lumbar facet loading: Negative  Hip ROM: No reproduction of pain with movement      Special Tests        Slump test: Negative  SLR: Negative  Tight hamstrings  Tight hip flexors  JED: Positive low back pain  FADIR: Negative  Stinchfield: Negative    Medical Decision Making:    Images: The imaging results as well as the actual images of the studies below were reviewed and visualized. MRI lumbar spine 11/17/2021  Small/moderate, broad-based, left eccentric disc extrusion with T2 high intensity zone dissects caudally in the left paracentral station and extends into the left greater than right foramen. Mild endplate ridging. Mild facet arthropathy.  Mild left subarticular recess stenosis. Otherwise patent canal. Mild/moderate left and mild right foraminal stenosis. Mass effect on the exiting left greater than right L5 nerve roots. Assessment:   - lumbar spondylosis l5-S1 with b/l foraminal narrowing    Plan:      -Physical therapy -referral to try dry needling  -Medications  Celebrex prn  -Diagnostics/Imaging - Reviewed MRI lumbar spine  -Injections s/p  b/l L5 TF SHANNON  -Lifestyle - Discussed exercises to avoid and those to continue   -Education - The patient's diagnosis, prognosis and treatment options were discussed today. All questions were answered.    F/U - prn      [address] [facility]

## 2022-09-19 RX ORDER — CELECOXIB 100 MG/1
100 CAPSULE ORAL 2 TIMES DAILY
Qty: 60 CAPSULE | Refills: 2 | Status: SHIPPED | OUTPATIENT
Start: 2022-09-19 | End: 2022-12-18

## 2022-09-20 ENCOUNTER — HOSPITAL ENCOUNTER (OUTPATIENT)
Dept: PHYSICAL THERAPY | Age: 42
Discharge: HOME | End: 2022-09-20
Payer: OTHER GOVERNMENT

## 2022-09-20 PROCEDURE — 97161 PT EVAL LOW COMPLEX 20 MIN: CPT

## 2022-09-20 PROCEDURE — 97535 SELF CARE MNGMENT TRAINING: CPT

## 2022-09-20 NOTE — PROGRESS NOTES
PHYSICAL THERAPY - DAILY TREATMENT NOTE    Patient Name: Albert Santizo        Date: 2022  : 1980   YES Patient  Verified  Visit #:      15  Insurance: Payor: JOHN / Plan: [address] / Product Type:  /      In time: 4:35 P Out time: 5:15 P   Total Treatment Time: 40     BCBS/Medicare Time Tracking (below)   Total Timed Codes (min):  Na 1:1 Treatment Time:  NA     TREATMENT AREA = Other low back pain [M54.59]    SUBJECTIVE  Pain Level (on 0 to 10 scale):  3  / 10   Medication Changes/New allergies or changes in medical history, any new surgeries or procedures?     NO    If yes, update Summary List   Subjective Functional Status/Changes:  []  No changes reported     See POC          Modalities Rationale:     Patient deferred   min [] Estim, type/location:                                      []  att     []  unatt     []  w/US     []  w/ice    []  w/heat    min []  Mechanical Traction: type/lbs                   []  pro   []  sup   []  int   []  cont    []  before manual    []  after manual    min []  Ultrasound, settings/location:      min []  Iontophoresis w/ dexamethasone, location:                                               []  take home patch       []  in clinic    min []  Ice     []  Heat    location/position:     min []  Vasopneumatic Device, press/temp:    If using vaso (only need to measure limb vaso being performed on)      pre-treatment girth :       post-treatment girth :       measured at (landmark location) :      min []  Other:    [] Skin assessment post-treatment (if applicable):    []  intact    []  redness- no adverse reaction                  []redness - adverse reaction:        15 min Self Care:  [x]  See flow sheet   Rationale:      increase ROM, increase strength, improve coordination, improve balance, and increase proprioception to improve the patients ability to return to previous work out program without increase in pain     Billed With/As: [] TE   [] TA   [] Neuro   [] Self Care Patient Education: [x] Review HEP    [] Progressed/Changed HEP based on:   [] positioning   [] body mechanics   [] transfers   [] heat/ice application    [] other:      Other Objective/Functional Measures:    SC: reviewed modifications to orange theory workouts, I.e. avoid jogging/walking on incline & row, try upright bike to promote upright postures, avoid weighted l/s movements such as deadlifts, weighted side bend or [place] twists     Post Treatment Pain Level (on 0 to 10) scale:   3  / 10     ASSESSMENT  Assessment/Changes in Function:     See POC     []  See Progress Note/Recertification   Patient will continue to benefit from skilled PT services to modify and progress therapeutic interventions, address functional mobility deficits, address ROM deficits, analyze and cue movement patterns, analyze and modify body mechanics/ergonomics, assess and modify postural abnormalities, address imbalance/dizziness, and instruct in home and community integration to attain remaining goals.    Progress toward goals / Updated goals:    Progressing towards goals established at [address] [facility] [address]  []  Upgrade activities as tolerated YES Continue plan of care   []  Discharge due to :    []  Other:      Therapist: Gailen Mortimer, PT    Date: 9/20/2022 Time: 7:30 PM     Future Appointments   Date Time Provider Hilaria Fung   9/28/2022  4:30 PM Yojana Sanchez, PT [facility]   10/3/2022 12:00 PM Yojana Sanchez PT [facility]   10/5/2022  5:20 PM Yojana Sanchez PT [facility]   10/12/2022  4:40 PM Yojana Sanchez PT [facility]   10/17/2022 11:20 AM Yojana Sanchez PT [facility]   10/24/2022 11:20 AM Yojana Sanchez PT [facility]   10/26/2022  4:00 PM Yojana Sanchez, PT [facility]   10/26/2022  4:40 PM Yojana Sanchez PT [facility]

## 2022-09-20 NOTE — PROGRESS NOTES
[address] [place] [address] PHYSICAL THERAPY AT [address] [place] [address]  [address], [address] ,[address], [address]  Phone: [phone]  Fax: [phone] / [address] [address] PHYSICAL THERAPY SERVICES  Patient Name: David Kuhn : 1980   Medical   Diagnosis: Other low back pain [M54.59] Treatment Diagnosis: LBP   Onset Date: 1.5 years prior to eval      Referral Source: Dawna Mckeon [facility]): 2022   Prior Hospitalization: See medical history Provider #: [id number]   Prior Level of Function: Manageable symptoms    Comorbidities: None   Medications: Verified on Patient Summary List   The Plan of Care and following information is based on the information from the initial evaluation.   ==================================================================================  Assessment / key information:  Patient is a pleasant 38 y.o. female who [facility] to [facility] with LBP & B hip pain. Patient reports chronic c/o LBP over the last 1.5 years which were of unknown etiology. Current c/o pain are constant in nature in lower back as well as R>L hip region & limits her ability to workout, she is unable to run or perform heavy lifting & reports additional c/o weakness with single leg activities on R LE, such as lunges, singe leg squats. She reports having X-rays/MRI last November that revealed L5-S1 HNP per patient report. She reports having 2 recent SHANNON with most recent injection this past  with good reduction of \"nerve pain\" with primary c/o \"muscular pain\". She reports having previous PT from -April this past year & had 1 session of dry needling that she feels was very beneficial. She is going to [address] sees a massage therapist once every 3 weeks to manage her pain. She is going to [employer] 3xs/week & has modified her workouts to accomodate her pain.  Average reported pain level at 6-7/10, 8-9/10 at worst & 3/10 at best.  Upon objective evaluation, patient demonstrates l/s AROM as follows full FIS, EIS 75%, B SGIS was not assessed. Slump test was (+) on R & SLR was (+) R/L for reproduction of LBP. Prone knee flexion test was also (+) on R. MMT revealed weakness of prone multifidus R>L with c/o pain upon MMT. Mild TTP along R multifidus & R lower lower paraspinals, c/o pain with PA mobility along lower l/s. Repeated movements had no lasting effect on symptoms today. Discussed modifications to current workout program to prevent exacerbation of pain.    Patient can benefit PT interventions to improve posture, decrease pain & improve strength to facilitate return to unlimited ADLs, work activities & overall functional status.   ==================================================================================  Eval Complexity: History LOW Complexity : Zero comorbidities / personal factors that will impact the outcome / POC;  Examination  MEDIUM Complexity : 3 Standardized tests and measures addressing body structure, function, activity limitation and / or participation in recreation ; Presentation LOW Complexity : Stable, uncomplicated ;  Decision Making MEDIUM Complexity : FOTO score of 26-74; Overall Complexity LOW   Problem List: pain affecting function, decrease ROM, decrease strength, impaired gait/ balance, decrease ADL/ functional abilitiies, decrease activity tolerance, decrease flexibility/ joint mobility, and decrease transfer abilities   Treatment Plan may include any combination of the following: Therapeutic exercise, Therapeutic activities, Neuromuscular re-education, Physical agent/modality, Gait/balance training, Manual therapy, Patient education, Self Care training, Functional mobility training, Home safety training, and Stair training  Patient / Family readiness to learn indicated by: asking questions, trying to perform skills and interest  Persons(s) to be included in education: patient (P)  Barriers to Learning/Limitations: None  Measures taken:    Patient Goal (s): \"Reduce muscle pain to get back to high intensity exercising\"    Patient self reported health status: good  Rehabilitation Potential: good  Short Term Goals: To be accomplished in  2  weeks:  1) Establish HEP to prevent further disability. 2) Patient will report decreased c/o pain to < or = 4-5/10 to facilitate return to workout program with manageable sx in lower back. 3) Improve FOTO score from 58 points to > or = 63 points indicating improved tolerance with ADLs in regards to l/s. 4) Patient will be able to demonstrate the appropriate body mechanics with lifting weighted box to prevent further injury for return to lifting at home/work. Long Term Goals: To be accomplished in  4  weeks:  1) Improve FOTO score from 63 points to > or = 68 points indicating improved tolerance with ADLs in regards to l/s. 2) Patient to report 50% improvement in c/o B hip pain to tolerate return to un/weighted squatting with current workout program.   3) Patient will demonstrate (-) neural tension with SLR/Slump on R LE to facilitate driving activities with manageable sx. 4) Patient will report decreased c/o pain to < or = 2-3/10 to facilitate return to workout program with manageable sx in lower back. Frequency / Duration:   Patient to be seen  2-3  times per week for 4  weeks:  Patient / Caregiver education and instruction: self care, activity modification, brace/ splint application and exercises    Therapist Signature: CASSY Todd cert MDT Date: 1/92/4804   Certification Period: None Time: 4:38 PM   =================================================================================  I certify that the above Physical Therapy Services are being furnished while the patient is under my care. I agree with the treatment plan and certify that this therapy is necessary.     Physician Signature: Date:                                     Time:                                                                       Porfirio Ramos*

## 2022-09-21 ENCOUNTER — TELEPHONE (OUTPATIENT)
Dept: PHYSICAL THERAPY | Age: 42
End: 2022-09-21

## 2022-09-21 NOTE — PROGRESS NOTES
Request for use of Dry Needling/Intramuscular Manual Therapy  Patient: Marni Brooks     Referral Source: Porfirio Bowman*  Diagnosis: Other low back pain [M54.59]    : 1980  Date of initial visit: 22   Attended visits: 1  Missed Visits: 0    Based on findings from the physical therapy examination and evaluation, the evaluating therapist believes the patient, Marni Brooks  would benefit from including Dry Needling as part of the plan of care. Dry needling is a treatment technique utilized in conjunction with other PT interventions to inactivate myofascial trigger points and the pain and dysfunction they cause. Dry Needling is an advanced procedure that requires additional training of intensive course work. PROCEDURE:  Solid filament sterile needle (typically 0.3mm/30 gauge) inserted into a trigger point  Repeated movements inactivate the trigger points, taking 30-60 seconds per site  Typically consists of 1 dry needling session per week and a possible second treatment including muscle re-education, flexibility, strengthening and other manual techniques to facilitate the benefits of dry needling     BENEFITS:  Inactivation of trigger points  Decreased pain  Increased muscle length  Improved movement patterns  Restoration of function POTENTIAL RISKS:  Post-needling soreness  Infection  Bruising/bleeding  Penetration of a nerve  Pneumothorax   All treating PTs have been thoroughly educated in avoiding adverse reactions    If you agree with this recommendation, please sign this form and fax it to us at [phone]. If you have questions or concerns regarding dry needling or any other treatment we may be providing, please contact us at [phone]    Thank you for allowing us to assist in the care of your patient.     Jamal Velasco, PT, DPT, MTC, CMTPT      2022 4:57 PM     NOTE TO PHYSICIAN:  PLEASE COMPLETE THE ORDERS BELOW AND   FAX TO [facility]: [phone]  If you are unable to process this request in 24 hours please contact our office:   [phone]    I have read the above request and AGREE to the recommendation of including dry needling as part of the plan of care. I have read the above request and DO NOT AGREE to including dry needling as part of the plan of care.   I have read the above report and request that my patient continue therapy with the following changes/special instructions:  ______________________________________________________________________________________    Physicians signature: _______________________________________________               [facility]*  Date: ______________    Time:_______________

## 2022-09-28 ENCOUNTER — HOSPITAL ENCOUNTER (OUTPATIENT)
Dept: PHYSICAL THERAPY | Age: 42
Discharge: HOME | End: 2022-09-28
Payer: OTHER GOVERNMENT

## 2022-09-28 PROCEDURE — 97112 NEUROMUSCULAR REEDUCATION: CPT

## 2022-09-28 PROCEDURE — 97110 THERAPEUTIC EXERCISES: CPT

## 2022-09-28 PROCEDURE — 97535 SELF CARE MNGMENT TRAINING: CPT

## 2022-09-28 PROCEDURE — 20560 NDL INSJ W/O NJX 1 OR 2 MUSC: CPT

## 2022-09-28 NOTE — PROGRESS NOTES
PHYSICAL THERAPY - DAILY TREATMENT NOTE    Patient Name: Luis Dyson        Date: 2022  : 1980   YES Patient  Verified  Visit #:   2   of   12  Insurance: Payor: JOHN / Plan: Calli Starr / Product Type:  /      In time: 253 Out time: 530   Total Treatment Time: 55     BCBS/Medicare Time Tracking (below)   Total Timed Codes (min):   1:1 Treatment Time:       TREATMENT AREA =  Other low back pain [M54.59]    SUBJECTIVE  Pain Level (on 0 to 10 scale):  3  / 10   Medication Changes/New allergies or changes in medical history, any new surgeries or procedures? NO    If yes, update Summary List   Subjective Functional Status/Changes:  []  No changes reported     My back tightens up  minute into trying to run.  Supine>sit>stand is the worst and I have to move pretty slow          Modalities Rationale:     decrease inflammation, decrease pain and increase tissue extensibility to improve patient's ability to perform ADLs   min [] Estim, type/location:                                     []  att     []  unatt     []  w/US     []  w/ice    []  w/heat    min []  Mechanical Traction: type/lbs                   []  pro   []  sup   []  int   []  cont    []  before manual    []  after manual    min []  Ultrasound, settings/location:      min []  Iontophoresis w/ dexamethasone, location:                                               []  take home patch       []  in clinic   10 min []  Ice     [x]  Heat    location/position: L/S in supine    min []  Vasopneumatic Device, press/temp: If using vaso (only need to measure limb vaso being performed on)      pre-treatment girth :       post-treatment girth :       measured at (landmark location) :      min []  Other:    [x] Skin assessment post-treatment (if applicable):    [x]  intact    [x]  redness- no adverse reaction     []redness - adverse reaction:        10 min Therapeutic Exercise:  [x]  See flow sheet   Rationale:      increase ROM and increase strength to improve the patients ability to perform unlimted ADLs     10 min Dry Needling:   Rationale:      decrease pain, increase ROM, increase tissue extensibility, and decrease trigger points to improve patient's ability to run, perform painfree transfers  Select one untimed code below based on number of muscle groups needled:  [x]  CPT 68606:  needle insertion(s) without injection(s); 1 or 2 muscle(s)  []  CPT 06705:  needle insertion(s) without injection(s); 3 or more muscles  Dry Needling Procedure Note    Dry Needle Session Number:  1    Procedure: An intramuscular manual therapy (dry needling) and a neuro-muscular re-education treatment was done to deactivate myofascial trigger points, with a 15/30 gauge solid filament needle, under aseptic technique. Indication(s): [] Muscle spasms [] Myalgia/Myositis  [] Muscle cramps      [] Muscle imbalances [] TMD (TMJ) [] Myofascial pain & dysfunction     [] Other: __    Chart reviewed for the following:  IJerrica, PT, have reviewed the medical history, summary list and precautions/contraindications for [facility].      TIME OUT performed immediately prior to start of procedure:  450pm (enter time the timeout was completed)  Jerrica REGAN PT, have performed the following reviews on [facility] prior to the start of the session:      [x] Patient was identified by name and date of birth    [x] Agreement on all muscles being treated was verified   [x] Purpose of dry needling, side effects, possible complications, risks and benefits were explained to the patient   [x] Procedure site(s) verified  [x] Patient was positioned for comfort and draped for privacy  [x] Informed Consent was signed (initial visit) and verified verbally (subsequent visits)  [x] Patient was instructed on the need to report the use of blood thinners and/or immunosuppressant medications  [x] How to respond to possible adverse effects of treatment  [x] Self treatment of post needling soreness: ice, heat (moist heat, heat wraps) and stretching  [x] Opportunity was given to ask any questions, all questions were answered            Treatment:  The following muscles were treated today:    Right: Lumbar paraspinals and multifidi   Left: Lumbar paraspinals and multifidi     Patients response to todays treatment:   [x]  LTRs  []  Muscle Relaxation  [x]  Pain Relief  []  Decreased Tinnitus  []  Decreased HAs [x]  Post needling soreness  []  Increased ROM   []  Other:        10 min Neuromuscular Re-ed: [x]  See flow sheet   Rationale:    improve coordination, improve balance, and increase proprioception to improve the patients postural awareness, stability and motor control    15 min Self Care: Lumbar roll, positioning when sleeping,       Billed With/As:   [x] TE   [] TA   [] Neuro   [] Self Care Patient Education: [x] Review HEP    [] Progressed/Changed HEP based on:   [x] positioning   [x] body mechanics   [] transfers   [] heat/ice application    [x] other: ab draw, ANIRUDH in the am, use of lumbar roll     Other Objective/Functional Measures:    See FS, added ANIRUDH and ab draw    Abn L and R hip ext firing pattern, dec L antirotational stability due to dec R multifidi strength/motor control     Post Treatment Pain Level (on 0 to 10) scale:   2-3  / 10     ASSESSMENT  Assessment/Changes in Function:     + LTR elicited to muscles to treated with dry needling technique. No adverse reactions from [address] [place] [address]. Fair TA recruitment in H/L.  Able to contract mm but fair ability to sustain contraction and cont breathing      []  See Progress Note/Recertification   Patient will continue to benefit from skilled PT services to modify and progress therapeutic interventions, address functional mobility deficits, address ROM deficits, address strength deficits, analyze and address soft tissue restrictions, analyze and cue movement patterns, analyze and modify body mechanics/ergonomics, assess and modify postural abnormalities, address imbalance/dizziness and instruct in home and community integration to attain remaining goals.    Progress toward goals / Updated goals:    Progressing towards STG1     PLAN  [x]  Upgrade activities as tolerated YES Continue plan of care   []  Discharge due to :    []  Other:      Therapist: Alma Mcintyre, PT, DPT, MTC, CMTPT    Date: 9/28/2022 Time: 5:43 PM     Future Appointments   Date Time Provider Hilaria Fung   10/3/2022 12:00 PM Tere Lewis, PT [facility]   10/5/2022  5:20 PM Tere Lewis PT [facility]   10/12/2022  4:40 PM Tere Lewis PT [facility]   10/17/2022 11:20 AM Tere Lewis PT [facility]   10/24/2022 11:20 AM Tere Lewis PT [facility]   10/26/2022  4:00 PM Tere Lewis PT [facility]   10/26/2022  4:40 PM Tere Lewis PT [facility]

## 2022-10-03 ENCOUNTER — HOSPITAL ENCOUNTER (OUTPATIENT)
Dept: PHYSICAL THERAPY | Age: 42
Discharge: HOME | End: 2022-10-03
Payer: OTHER GOVERNMENT

## 2022-10-03 PROCEDURE — 97112 NEUROMUSCULAR REEDUCATION: CPT

## 2022-10-03 PROCEDURE — 20560 NDL INSJ W/O NJX 1 OR 2 MUSC: CPT

## 2022-10-03 PROCEDURE — 97110 THERAPEUTIC EXERCISES: CPT

## 2022-10-03 NOTE — PROGRESS NOTES
PHYSICAL THERAPY - DAILY TREATMENT NOTE    Patient Name: Pedro Agee        Date: 10/3/2022  : 1980   YES Patient  Verified  Visit #:   3   of   12  Insurance: Payor: JOHN / Plan: Arsh Ferro 74 / Product Type:  /      In time: 1200 Out time: 100   Total Treatment Time: 55     BCBS/Medicare Time Tracking (below)   Total Timed Codes (min):   1:1 Treatment Time:       TREATMENT AREA =  Other low back pain [M54.59]    SUBJECTIVE  Pain Level (on 0 to 10 scale):  4  / 10   Medication Changes/New allergies or changes in medical history, any new surgeries or procedures? NO    If yes, update Summary List   Subjective Functional Status/Changes:  []  No changes reported     Not too sore after LV.  Lita been working on the single leg bridges       Modalities Rationale:     decrease inflammation, decrease pain and increase tissue extensibility to improve patient's ability to perform ADLs   min [] Estim, type/location:                                     []  att     []  unatt     []  w/US     []  w/ice    []  w/heat    min []  Mechanical Traction: type/lbs                   []  pro   []  sup   []  int   []  cont    []  before manual    []  after manual    min []  Ultrasound, settings/location:      min []  Iontophoresis w/ dexamethasone, location:                                               []  take home patch       []  in clinic   10 min []  Ice     [x]  Heat    location/position: L/S in supine    min []  Vasopneumatic Device, press/temp: If using vaso (only need to measure limb vaso being performed on)      pre-treatment girth :       post-treatment girth :       measured at (landmark location) :      min []  Other:    [x] Skin assessment post-treatment (if applicable):    [x]  intact    [x]  redness- no adverse reaction     []redness - adverse reaction:        10 min Therapeutic Exercise:  [x]  See flow sheet   Rationale:      increase ROM and increase strength to improve the patients ability to perform unlimted ADLs     10 min Dry Needling:   Rationale:      decrease pain, increase ROM, increase tissue extensibility, and decrease trigger points to improve patient's ability to run, perform painfree transfers  Select one untimed code below based on number of muscle groups needled:  [x]  CPT 41176:  needle insertion(s) without injection(s); 1 or 2 muscle(s)  []  CPT 99492:  needle insertion(s) without injection(s); 3 or more muscles  Dry Needling Procedure Note    Dry Needle Session Number:  2    Procedure: An intramuscular manual therapy (dry needling) and a neuro-muscular re-education treatment was done to deactivate myofascial trigger points, with a 15/30 gauge solid filament needle, under aseptic technique. Indication(s): [] Muscle spasms [] Myalgia/Myositis  [] Muscle cramps      [] Muscle imbalances [] TMD (TMJ) [] Myofascial pain & dysfunction     [] Other: __    Chart reviewed for the following:  IJerrica, PT, have reviewed the medical history, summary list and precautions/contraindications for [facility].      TIME OUT performed immediately prior to start of procedure:  1210pm (enter time the timeout was completed)  Jerrica REGAN PT, have performed the following reviews on [facility] prior to the start of the session:      [x] Patient was identified by name and date of birth    [x] Agreement on all muscles being treated was verified   [x] Purpose of dry needling, side effects, possible complications, risks and benefits were explained to the patient   [x] Procedure site(s) verified  [x] Patient was positioned for comfort and draped for privacy  [x] Informed Consent was signed (initial visit) and verified verbally (subsequent visits)  [x] Patient was instructed on the need to report the use of blood thinners and/or immunosuppressant medications  [x] How to respond to possible adverse effects of treatment  [x] Self treatment of post needling soreness: ice, heat (moist heat, heat wraps) and stretching  [x] Opportunity was given to ask any questions, all questions were answered            Treatment:  The following muscles were treated today:    Right: Lumbar paraspinals and multifidi, prox glut max   Left: Lumbar paraspinals and multifidi, prox glut max     Patients response to todays treatment:   [x]  LTRs  []  Muscle Relaxation  [x]  Pain Relief  []  Decreased Tinnitus  []  Decreased HAs [x]  Post needling soreness  []  Increased ROM   []  Other:        25 min Neuromuscular Re-ed: [x]  See flow sheet   Rationale:    improve coordination, improve balance, and increase proprioception to improve the patients postural awareness, stability and motor control    Billed With/As:   [x] TE   [] TA   [] Neuro   [] Self Care Patient Education: [x] Review HEP    [] Progressed/Changed HEP based on:   [x] positioning   [x] body mechanics   [] transfers   [] heat/ice application    [x] other: ab draw, ANIRUDH in the am, use of lumbar roll     Other Objective/Functional Measures:    See FS, added SB march and stabilizer cuff 1-4     Post Treatment Pain Level (on 0 to 10) scale:  3  / 10     ASSESSMENT  Assessment/Changes in Function:     + LTR elicited to muscles to treated with dry needling technique. No adverse reactions from [place].     []  See Progress Note/Recertification   Patient will continue to benefit from skilled PT services to modify and progress therapeutic interventions, address functional mobility deficits, address ROM deficits, address strength deficits, analyze and address soft tissue restrictions, analyze and cue movement patterns, analyze and modify body mechanics/ergonomics, assess and modify postural abnormalities, address imbalance/dizziness and instruct in home and community integration to attain remaining goals.    Progress toward goals / Updated goals:    Progressing towards STG1     PLAN  [x]  Upgrade activities as tolerated YES Continue plan of care   [] Discharge due to :    []  Other:      Therapist: Zuhair Woodward, PT, DPT, MTC, CMTPT    Date: 10/3/2022 Time: 5:43 PM     Future Appointments   Date Time Provider Hilaria Fung   10/5/2022  5:20 PM Anibal Mann, PT [facility]   10/12/2022  4:40 PM Anibal Mann, PT [facility]   10/17/2022 11:20 AM Anibal Mann, PT [facility]   10/24/2022 11:20 AM Anibal Sickle, PT [facility]   10/26/2022  4:00 PM Anibal Mann, PT [facility]   10/26/2022  4:40 PM Anibal Mann, PT [facility]

## 2022-10-05 ENCOUNTER — HOSPITAL ENCOUNTER (OUTPATIENT)
Dept: PHYSICAL THERAPY | Age: 42
Discharge: HOME | End: 2022-10-05
Payer: OTHER GOVERNMENT

## 2022-10-05 PROCEDURE — 97112 NEUROMUSCULAR REEDUCATION: CPT

## 2022-10-05 PROCEDURE — 97110 THERAPEUTIC EXERCISES: CPT

## 2022-10-05 NOTE — PROGRESS NOTES
PHYSICAL THERAPY - DAILY TREATMENT NOTE    Patient Name: Zachary Alicea        Date: 10/5/2022  : 1980   YES Patient  Verified  Visit #:      12  Insurance: Payor:  / Plan: Arsh Ferro 74 / Product Type:  /      In time: 525 Out time: 615   Total Treatment Time: 45     BCBS/Medicare Time Tracking (below)   Total Timed Codes (min):   1:1 Treatment Time:       TREATMENT AREA =  Other low back pain [M54.59]    SUBJECTIVE  Pain Level (on 0 to 10 scale):  3-4   10   Medication Changes/New allergies or changes in medical history, any new surgeries or procedures? NO    If yes, update Summary List   Subjective Functional Status/Changes:  []  No changes reported     Sore after the needling more than the first time.  I'm really imbalance when lunging       Modalities Rationale:     decrease inflammation, decrease pain and increase tissue extensibility to improve patient's ability to perform ADLs   min [] Estim, type/location:                                     []  att     []  unatt     []  w/US     []  w/ice    []  w/heat    min []  Mechanical Traction: type/lbs                   []  pro   []  sup   []  int   []  cont    []  before manual    []  after manual    min []  Ultrasound, settings/location:      min []  Iontophoresis w/ dexamethasone, location:                                               []  take home patch       []  in clinic   PD min []  Ice     []  Heat    location/position:     min []  Vasopneumatic Device, press/temp: If using vaso (only need to measure limb vaso being performed on)      pre-treatment girth :       post-treatment girth :       measured at (landmark location) :      min []  Other:    [x] Skin assessment post-treatment (if applicable):    [x]  intact    [x]  redness- no adverse reaction     []redness - adverse reaction:        15 min Therapeutic Exercise:  [x]  See flow sheet   Rationale:      increase ROM and increase strength to improve the patients ability to perform unlimted ADLs     30 min Neuromuscular Re-ed: [x]  See flow sheet   Rationale:    improve coordination, improve balance, and increase proprioception to improve the patients postural awareness, stability and motor control      Billed With/As:   [x] TE   [] TA   [x] Neuro   [] Self Care Patient Education: [x] Review HEP    [] Progressed/Changed HEP based on:   [x] positioning   [x] body mechanics   [] transfers   [] heat/ice application    [x] other:      Other Objective/Functional Measures:    See FS     Post Treatment Pain Level (on 0 to 10) scale:   3-4  / 10     ASSESSMENT  Assessment/Changes in Function:     Able to perform all core stab exercises without inc in pain but reported tightness and need to stretch into lumbar flexion after each set. Fair anti-rotational stability and quick to fatigue     []  See Progress Note/Recertification   Patient will continue to benefit from skilled PT services to modify and progress therapeutic interventions, address functional mobility deficits, address ROM deficits, address strength deficits, analyze and address soft tissue restrictions, analyze and cue movement patterns, analyze and modify body mechanics/ergonomics, assess and modify postural abnormalities, address imbalance/dizziness and instruct in home and community integration to attain remaining goals.    Progress toward goals / Updated goals:    Progressing towards STG1     PLAN  [x]  Upgrade activities as tolerated YES Continue plan of care   []  Discharge due to :    []  Other:      Therapist: Beth Knox, PT, DPT, MTC, CMTPT    Date: 10/5/2022 Time: 5:43 PM     Future Appointments   Date Time Provider Hilaria Fung   10/12/2022  4:40 PM Bhavana Anderson PT [facility]   10/17/2022 11:20 AM Bhavana Anderson PT [facility]   10/24/2022 11:20 AM JOSE Dykes [facility]   10/26/2022  4:00 PM Bhavana Anderson PT [facility]   10/26/2022  4:40 PM Berenice Nunn PT [facility]

## 2022-10-12 ENCOUNTER — HOSPITAL ENCOUNTER (OUTPATIENT)
Dept: PHYSICAL THERAPY | Age: 42
Discharge: HOME | End: 2022-10-12
Payer: OTHER GOVERNMENT

## 2022-10-12 PROCEDURE — 20560 NDL INSJ W/O NJX 1 OR 2 MUSC: CPT

## 2022-10-12 PROCEDURE — 97112 NEUROMUSCULAR REEDUCATION: CPT

## 2022-10-12 PROCEDURE — 97110 THERAPEUTIC EXERCISES: CPT

## 2022-10-12 NOTE — PROGRESS NOTES
PHYSICAL THERAPY - DAILY TREATMENT NOTE    Patient Name: David Fleming        Date: 10/12/2022  : 1980   YES Patient  Verified  Visit #:     Insurance: Payor: JOHN / Plan: Arsh Ferro 74 / Product Type:  /      In time: 445 Out time: 535   Total Treatment Time: 50     BCBS/Medicare Time Tracking (below)   Total Timed Codes (min):   1:1 Treatment Time:       TREATMENT AREA =  Other low back pain [M54.59]    SUBJECTIVE  Pain Level (on 0 to 10 scale):  3  / 10   Medication Changes/New allergies or changes in medical history, any new surgeries or procedures? NO    If yes, update Summary List   Subjective Functional Status/Changes:  []  No changes reported     I feel a lot of tightness/pressure on the L side of my lower back that goes down my butt almost in a line a few inches.  Its worse when I I twist my hips or bend forward and stretch        Modalities Rationale:     decrease inflammation, decrease pain and increase tissue extensibility to improve patient's ability to perform ADLs   min [] Estim, type/location:                                     []  att     []  unatt     []  w/US     []  w/ice    []  w/heat    min []  Mechanical Traction: type/lbs                   []  pro   []  sup   []  int   []  cont    []  before manual    []  after manual    min []  Ultrasound, settings/location:      min []  Iontophoresis w/ dexamethasone, location:                                               []  take home patch       []  in clinic   10 min []  Ice     [x]  Heat    location/position: L/S in supine    min []  Vasopneumatic Device, press/temp: If using vaso (only need to measure limb vaso being performed on)      pre-treatment girth :       post-treatment girth :       measured at (landmark location) :      min []  Other:    [x] Skin assessment post-treatment (if applicable):    [x]  intact    [x]  redness- no adverse reaction     []redness - adverse reaction:        10 min Therapeutic Exercise:  [x]  See flow sheet   Rationale:      increase ROM and increase strength to improve the patients ability to perform unlimted ADLs     10 min Dry Needling:   Rationale:      decrease pain, increase ROM, increase tissue extensibility, and decrease trigger points to improve patient's ability to run, perform painfree transfers  Select one untimed code below based on number of muscle groups needled:  [x]  CPT 77784:  needle insertion(s) without injection(s); 1 or 2 muscle(s)  []  CPT 32212:  needle insertion(s) without injection(s); 3 or more muscles  Dry Needling Procedure Note    Dry Needle Session Number:  3    Procedure: An intramuscular manual therapy (dry needling) and a neuro-muscular re-education treatment was done to deactivate myofascial trigger points, with a 15/30 gauge solid filament needle, under aseptic technique. Indication(s): [] Muscle spasms [] Myalgia/Myositis  [] Muscle cramps      [] Muscle imbalances [] TMD (TMJ) [] Myofascial pain & dysfunction     [] Other: __    Chart reviewed for the following:  IJerrica, PT, have reviewed the medical history, summary list and precautions/contraindications for [employer].      TIME OUT performed immediately prior to start of procedure:  500pm (enter time the timeout was completed)  Jerrica REGAN PT, have performed the following reviews on [employer] prior to the start of the session:      [x] Patient was identified by name and date of birth    [x] Agreement on all muscles being treated was verified   [x] Purpose of dry needling, side effects, possible complications, risks and benefits were explained to the patient   [x] Procedure site(s) verified  [x] Patient was positioned for comfort and draped for privacy  [x] Informed Consent was signed (initial visit) and verified verbally (subsequent visits)  [x] Patient was instructed on the need to report the use of blood thinners and/or immunosuppressant medications  [x] How to respond to possible adverse effects of treatment  [x] Self treatment of post needling soreness: ice, heat (moist heat, heat wraps) and stretching  [x] Opportunity was given to ask any questions, all questions were answered            Treatment:  The following muscles were treated today:    Right: Lumbar paraspinals and multifidi    Left: Lumbar paraspinals and multifidi (x3 ~L4-S1)     Patients response to todays treatment:   [x]  LTRs  []  Muscle Relaxation  [x]  Pain Relief  []  Decreased Tinnitus  []  Decreased HAs [x]  Post needling soreness  []  Increased ROM   []  Other:        20 min Neuromuscular Re-ed: [x]  See flow sheet   Rationale:    improve coordination, improve balance, and increase proprioception to improve the patients postural awareness, stability and motor control    Billed With/As:   [x] TE   [] TA   [x] Neuro   [] Self Care Patient Education: [x] Review HEP    [] Progressed/Changed HEP based on:   [x] positioning   [x] body mechanics   [] transfers   [] heat/ice application    [x] other:      Other Objective/Functional Measures:    See FS, added PPT>bridge and prone multifidi     Post Treatment Pain Level (on 0 to 10) scale:  3  / 10     ASSESSMENT  Assessment/Changes in Function:     + LTR elicited to muscles to treated with dry needling technique. No adverse reactions from [address]. Multiple LTR in ~L4/S1 L multifidi. Fair stability on L noted during R prone hip ext. Mm juddering noted during eccentric lowering of bridge.  Cueing to improve motor control and inc mm recruitment     []  See Progress Note/Recertification   Patient will continue to benefit from skilled PT services to modify and progress therapeutic interventions, address functional mobility deficits, address ROM deficits, address strength deficits, analyze and address soft tissue restrictions, analyze and cue movement patterns, analyze and modify body mechanics/ergonomics, assess and modify postural abnormalities, address imbalance/dizziness and instruct in home and community integration to attain remaining goals.    Progress toward goals / Updated goals:    Met STG1, progressing towards STG2     PLAN  [x]  Upgrade activities as tolerated YES Continue plan of care   []  Discharge due to :    []  Other:      Therapist: Babs Thomason, PT, DPT, MTC, CMTPT    Date: 10/12/2022 Time: 5:43 PM     Future Appointments   Date Time Provider Hilaria Fung   10/17/2022 11:20 AM Nestor Sherwood PT [facility] [address]   10/24/2022 11:20 AM Nestor Sherwood PT [facility] [address]   10/26/2022  4:00 PM Nestor Sherwood PT [facility] [address]   10/26/2022  4:40 PM Nestor Sherwood PT [facility] [address]

## 2022-10-17 ENCOUNTER — HOSPITAL ENCOUNTER (OUTPATIENT)
Dept: PHYSICAL THERAPY | Age: 42
Discharge: HOME | End: 2022-10-17
Payer: OTHER GOVERNMENT

## 2022-10-17 PROCEDURE — 97110 THERAPEUTIC EXERCISES: CPT

## 2022-10-17 PROCEDURE — 20560 NDL INSJ W/O NJX 1 OR 2 MUSC: CPT

## 2022-10-17 PROCEDURE — 97112 NEUROMUSCULAR REEDUCATION: CPT

## 2022-10-17 NOTE — PROGRESS NOTES
PHYSICAL THERAPY - DAILY TREATMENT NOTE    Patient Name: Bassem Sandoval        Date: 10/17/2022  : 1980   YES Patient  Verified  Visit #:      12  Insurance: Payor: JOHN / Plan: Arsh Ferro 74 / Product Type:  /      In time: 1130 Out time:    Total Treatment Time: 45     BCBS/Medicare Time Tracking (below)   Total Timed Codes (min):   1:1 Treatment Time:       TREATMENT AREA =  Other low back pain [M54.59]    SUBJECTIVE  Pain Level (on 0 to 10 scale):  3  / 10   Medication Changes/New allergies or changes in medical history, any new surgeries or procedures?     NO    If yes, update Summary List   Subjective Functional Status/Changes:  []  No changes reported     Working on the core exercises more       Modalities Rationale:     decrease inflammation, decrease pain and increase tissue extensibility to improve patient's ability to perform ADLs   min [] Estim, type/location:                                     []  att     []  unatt     []  w/US     []  w/ice    []  w/heat    min []  Mechanical Traction: type/lbs                   []  pro   []  sup   []  int   []  cont    []  before manual    []  after manual    min []  Ultrasound, settings/location:      min []  Iontophoresis w/ dexamethasone, location:                                               []  take home patch       []  in clinic   PD min []  Ice     []  Heat    location/position:     min []  Vasopneumatic Device, press/temp: If using vaso (only need to measure limb vaso being performed on)      pre-treatment girth :       post-treatment girth :       measured at (landmark location) :      min []  Other:    [x] Skin assessment post-treatment (if applicable):    [x]  intact    [x]  redness- no adverse reaction     []redness - adverse reaction:        15 min Therapeutic Exercise:  [x]  See flow sheet   Rationale:      increase ROM and increase strength to improve the patients ability to perform unlimted ADLs     30 min Neuromuscular Re-ed: [x]  See flow sheet   Rationale:    improve coordination, improve balance, and increase proprioception to improve the patients postural awareness, stability and motor control    Billed With/As:   [x] TE   [] TA   [x] Neuro   [] Self Care Patient Education: [x] Review HEP    [] Progressed/Changed HEP based on:   [x] positioning   [x] body mechanics   [] transfers   [] heat/ice application    [x] other:      Other Objective/Functional Measures:    See FS, added 90/90 hold, progressed deadbug, SB palloff press and 1/2 kneel palloff press     Post Treatment Pain Level (on 0 to 10) scale:  3  / 10     ASSESSMENT  Assessment/Changes in Function:     Improving anti-rotational stability and control. Fewer stretching rest breaks needed     []  See Progress Note/Recertification   Patient will continue to benefit from skilled PT services to modify and progress therapeutic interventions, address functional mobility deficits, address ROM deficits, address strength deficits, analyze and address soft tissue restrictions, analyze and cue movement patterns, analyze and modify body mechanics/ergonomics, assess and modify postural abnormalities, address imbalance/dizziness and instruct in home and community integration to attain remaining goals.    Progress toward goals / Updated goals:    met STG2     PLAN  [x]  Upgrade activities as tolerated YES Continue plan of care   []  Discharge due to :    []  Other:      Therapist: Evan Bermudez PT, DPT, MTC, CMTPT    Date: 10/17/2022 Time: 5:43 PM     Future Appointments   Date Time Provider Hilaria Fung   10/24/2022 11:20 AM Galina Lofton PT [facility]   10/26/2022  4:00 PM Galina Lofton PT [facility]   10/26/2022  4:40 PM Galina Lofton PT [facility]

## 2022-10-24 ENCOUNTER — HOSPITAL ENCOUNTER (OUTPATIENT)
Dept: PHYSICAL THERAPY | Age: 42
Discharge: HOME | End: 2022-10-24
Payer: OTHER GOVERNMENT

## 2022-10-24 PROCEDURE — 97110 THERAPEUTIC EXERCISES: CPT

## 2022-10-24 PROCEDURE — 97112 NEUROMUSCULAR REEDUCATION: CPT

## 2022-10-24 PROCEDURE — 20560 NDL INSJ W/O NJX 1 OR 2 MUSC: CPT

## 2022-10-25 NOTE — PROGRESS NOTES
PHYSICAL THERAPY - DAILY TREATMENT NOTE    Patient Name: Luis Miguel Parsons        Date: 10/24/2022  : 1980   YES Patient  Verified  Visit #:   ***   of   12  Insurance: Payor:  / Plan: Silva Robles / Product Type:  /      In time: 1200 Out time: 100   Total Treatment Time: 55     BCBS/Medicare Time Tracking (below)   Total Timed Codes (min):   1:1 Treatment Time:       TREATMENT AREA =  Other low back pain [M54.59]    SUBJECTIVE  Pain Level (on 0 to 10 scale):  ***  / 10   Medication Changes/New allergies or changes in medical history, any new surgeries or procedures? NO    If yes, update Summary List   Subjective Functional Status/Changes:  []  No changes reported     Overall feeling better.  My back always feels tight but not at the intensity it was before       Modalities Rationale:     decrease inflammation, decrease pain and increase tissue extensibility to improve patient's ability to perform ADLs   min [] Estim, type/location:                                     []  att     []  unatt     []  w/US     []  w/ice    []  w/heat    min []  Mechanical Traction: type/lbs                   []  pro   []  sup   []  int   []  cont    []  before manual    []  after manual    min []  Ultrasound, settings/location:      min []  Iontophoresis w/ dexamethasone, location:                                               []  take home patch       []  in clinic   PD min []  Ice     []  Heat    location/position:     min []  Vasopneumatic Device, press/temp: If using vaso (only need to measure limb vaso being performed on)      pre-treatment girth :       post-treatment girth :       measured at (landmark location) :      min []  Other:    [x] Skin assessment post-treatment (if applicable):    [x]  intact    [x]  redness- no adverse reaction     []redness - adverse reaction:        10 min Therapeutic Exercise:  [x]  See flow sheet   Rationale:      increase ROM and increase strength to improve the patients ability to perform unlimted ADLs     10 min Dry Needling:   Rationale:      decrease pain, increase ROM, increase tissue extensibility, and decrease trigger points to improve patient's ability to run, perform painfree transfers  Select one untimed code below based on number of muscle groups needled:  [x]  CPT 95374:  needle insertion(s) without injection(s); 1 or 2 muscle(s)  []  CPT 11990:  needle insertion(s) without injection(s); 3 or more muscles  Dry Needling Procedure Note    Dry Needle Session Number:  ***    Procedure: An intramuscular manual therapy (dry needling) and a neuro-muscular re-education treatment was done to deactivate myofascial trigger points, with a 15/30 gauge solid filament needle, under aseptic technique. Indication(s): [] Muscle spasms [] Myalgia/Myositis  [] Muscle cramps      [] Muscle imbalances [] TMD (TMJ) [] Myofascial pain & dysfunction     [] Other: __    Chart reviewed for the following:  IJerrica, PT, have reviewed the medical history, summary list and precautions/contraindications for [employer].      TIME OUT performed immediately prior to start of procedure:  ***am (enter time the timeout was completed)  Jerrica REGAN, PT, have performed the following reviews on [employer] prior to the start of the session:      [x] Patient was identified by name and date of birth    [x] Agreement on all muscles being treated was verified   [x] Purpose of dry needling, side effects, possible complications, risks and benefits were explained to the patient   [x] Procedure site(s) verified  [x] Patient was positioned for comfort and draped for privacy  [x] Informed Consent was signed (initial visit) and verified verbally (subsequent visits)  [x] Patient was instructed on the need to report the use of blood thinners and/or immunosuppressant medications  [x] How to respond to possible adverse effects of treatment  [x] Self treatment of post needling soreness: ice, heat (moist heat, heat wraps) and stretching  [x] Opportunity was given to ask any questions, all questions were answered            Treatment:  The following muscles were treated today:    Right: Lumbar paraspinals and multifidi   Left: Lumbar paraspinals and multifidi     Patients response to todays treatment:   [x]  LTRs  []  Muscle Relaxation  [x]  Pain Relief  []  Decreased Tinnitus  []  Decreased HAs [x]  Post needling soreness  []  Increased ROM   []  Other:        25 min Neuromuscular Re-ed: [x]  See flow sheet   Rationale:    improve coordination, improve balance, and increase proprioception to improve the patients postural awareness, stability and motor control    Billed With/As:   [x] TE   [] TA   [] Neuro   [] Self Care Patient Education: [x] Review HEP    [] Progressed/Changed HEP based on:   [x] positioning   [x] body mechanics   [] transfers   [] heat/ice application    [x] other: ab draw, ANIRUDH in the am, use of lumbar roll     Other Objective/Functional Measures:    See FS, added SB march and stabilizer cuff 1-4     Post Treatment Pain Level (on 0 to 10) scale:  3  / 10     ASSESSMENT  Assessment/Changes in Function:     + LTR elicited to muscles to treated with dry needling technique. No adverse reactions from [place].     []  See Progress Note/Recertification   Patient will continue to benefit from skilled PT services to modify and progress therapeutic interventions, address functional mobility deficits, address ROM deficits, address strength deficits, analyze and address soft tissue restrictions, analyze and cue movement patterns, analyze and modify body mechanics/ergonomics, assess and modify postural abnormalities, address imbalance/dizziness and instruct in home and community integration to attain remaining goals.    Progress toward goals / Updated goals:    Progressing towards STG1     PLAN  [x]  Upgrade activities as tolerated YES Continue plan of care   []  Discharge due to : []  Other: Plan to [place]     Therapist: Jennifer Galeano, PT, DPT, MTC, CMTPT    Date: 10/24/2022 Time: 5:43 PM     Future Appointments   Date Time Provider Hilaria Fung   10/26/2022  4:00 PM Yojana Turner PT [facility]

## 2022-10-26 ENCOUNTER — APPOINTMENT (OUTPATIENT)
Dept: PHYSICAL THERAPY | Age: 42
End: 2022-10-26
Payer: OTHER GOVERNMENT

## 2022-10-26 ENCOUNTER — HOSPITAL ENCOUNTER (OUTPATIENT)
Dept: PHYSICAL THERAPY | Age: 42
Discharge: HOME | End: 2022-10-26
Payer: OTHER GOVERNMENT

## 2022-10-26 PROCEDURE — 97112 NEUROMUSCULAR REEDUCATION: CPT

## 2022-10-26 PROCEDURE — 97535 SELF CARE MNGMENT TRAINING: CPT

## 2022-10-26 PROCEDURE — 97110 THERAPEUTIC EXERCISES: CPT

## 2022-10-26 NOTE — PROGRESS NOTES
PHYSICAL THERAPY - DAILY TREATMENT NOTE    Patient Name: Ronan Glynn        Date: 10/26/2022  : 1980   YES Patient  Verified  Visit #:     Insurance: Payor: JOHN / Plan: Arsh Ferro 74 / Product Type:  /      In time: 1130 Out time: 249   Total Treatment Time: 45     BCBS/Medicare Time Tracking (below)   Total Timed Codes (min):   1:1 Treatment Time:       TREATMENT AREA =  Other low back pain [M54.59]    SUBJECTIVE  Pain Level (on 0 to 10 scale):  3  / 10   Medication Changes/New allergies or changes in medical history, any new surgeries or procedures?     NO    If yes, update Summary List   Subjective Functional Status/Changes:  []  No changes reported     Working on the core exercises more       Modalities Rationale:     decrease inflammation, decrease pain and increase tissue extensibility to improve patient's ability to perform ADLs   min [] Estim, type/location:                                     []  att     []  unatt     []  w/US     []  w/ice    []  w/heat    min []  Mechanical Traction: type/lbs                   []  pro   []  sup   []  int   []  cont    []  before manual    []  after manual    min []  Ultrasound, settings/location:      min []  Iontophoresis w/ dexamethasone, location:                                               []  take home patch       []  in clinic   PD min []  Ice     []  Heat    location/position:     min []  Vasopneumatic Device, press/temp: If using vaso (only need to measure limb vaso being performed on)      pre-treatment girth :       post-treatment girth :       measured at (landmark location) :      min []  Other:    [x] Skin assessment post-treatment (if applicable):    [x]  intact    [x]  redness- no adverse reaction     []redness - adverse reaction:        15 min Therapeutic Exercise:  [x]  See flow sheet   Rationale:      increase ROM and increase strength to improve the patients ability to perform unlimted ADLs     30 min Neuromuscular Re-ed: [x]  See flow sheet   Rationale:    improve coordination, improve balance, and increase proprioception to improve the patients postural awareness, stability and motor control    Billed With/As:   [x] TE   [] TA   [x] Neuro   [] Self Care Patient Education: [x] Review HEP    [] Progressed/Changed HEP based on:   [x] positioning   [x] body mechanics   [] transfers   [] heat/ice application    [x] other:      Other Objective/Functional Measures:    See FS, added 90/90 hold, progressed deadbug, SB palloff press and 1/2 kneel palloff press     Post Treatment Pain Level (on 0 to 10) scale:  3  / 10     ASSESSMENT  Assessment/Changes in Function:     Improving anti-rotational stability and control. Fewer stretching rest breaks needed     []  See Progress Note/Recertification   Patient will continue to benefit from skilled PT services to modify and progress therapeutic interventions, address functional mobility deficits, address ROM deficits, address strength deficits, analyze and address soft tissue restrictions, analyze and cue movement patterns, analyze and modify body mechanics/ergonomics, assess and modify postural abnormalities, address imbalance/dizziness and instruct in home and community integration to attain remaining goals. Progress toward goals / Updated goals:    met STG2     PLAN  [x]  Upgrade activities as tolerated YES Continue plan of care   []  Discharge due to :    []  Other:      Therapist: Bradley Whitley, PT, DPT, MTC, CMTPT    Date: 10/26/2022 Time: 5:43 PM     No future appointments.

## 2023-02-05 RX ORDER — CELECOXIB 100 MG/1
100 CAPSULE ORAL
Qty: 60 CAPSULE | Refills: 0 | Status: SHIPPED | OUTPATIENT
Start: 2023-02-05 | End: 2023-03-07

## 2023-04-03 RX ORDER — CELECOXIB 100 MG/1
CAPSULE ORAL
Qty: 30 CAPSULE | Refills: 1 | Status: SHIPPED | OUTPATIENT
Start: 2023-04-03

## 2023-05-22 RX ORDER — ATORVASTATIN CALCIUM 40 MG/1
40 TABLET, FILM COATED ORAL DAILY
COMMUNITY
Start: 2023-05-11

## 2023-05-22 NOTE — TELEPHONE ENCOUNTER
Requested Prescriptions     Pending Prescriptions Disp Refills    celecoxib (CELEBREX) 100 MG capsule [Pharmacy Med Name: CELECOXIB 100 MG CAPSULE] 30 capsule 1     Sig: TAKE 1 CAPSULE BY MOUTH DAILY AS NEEDED FOR PAIN FOR UP TO 30 DAYS        Patient was last seen on 5/10/22. Patient's choice of pharmacy [facility] in [address] [place] [address].     The refill request's last refill info   celecoxib (CELEBREX) 100 MG capsule 30 capsule 1 4/3/2023     Sig: TAKE 1 CAPSULE BY MOUTH DAILY AS NEEDED FOR PAIN FOR UP TO 30 DAYS    Sent to pharmacy as: Celecoxib 100 MG Oral Capsule (CELEBREX)    E-Prescribing Status: Receipt confirmed by pharmacy (4/3/2023 12:15 PM EDT)

## 2023-05-23 RX ORDER — CELECOXIB 100 MG/1
CAPSULE ORAL
Qty: 30 CAPSULE | Refills: 1 | Status: SHIPPED | OUTPATIENT
Start: 2023-05-23

## 2023-07-11 RX ORDER — CELECOXIB 100 MG/1
CAPSULE ORAL
Qty: 30 CAPSULE | Refills: 1 | OUTPATIENT
Start: 2023-07-11

## (undated) DEVICE — SYR 10ML LUER LOK 1/5ML GRAD --

## (undated) DEVICE — MEDIA CONTRAST 10ML 200MG/ML 41%

## (undated) DEVICE — TRAY MYEL SFTY +

## (undated) DEVICE — NEEDLE EPI 18GA TUOHY WNG W/ CLR HUB PERIFIX

## (undated) DEVICE — Device: Brand: MEDEX

## (undated) DEVICE — BANDAGE ADH W0.75XL3IN UNIV WVN FAB NAT GEN USE STRP N ADH

## (undated) DEVICE — AVANOS* SHORT BEVEL NEEDLE: Brand: AVANOS

## (undated) DEVICE — SET EPI 18GA L3.5IN TUOHY NDL W/ 20GA CLS TIP NYL CATH

## (undated) DEVICE — (D)NDL SPNE 22GX15CM -- DISC BY MFR W/NO SUB